# Patient Record
Sex: FEMALE | Race: WHITE | NOT HISPANIC OR LATINO | Employment: OTHER | URBAN - METROPOLITAN AREA
[De-identification: names, ages, dates, MRNs, and addresses within clinical notes are randomized per-mention and may not be internally consistent; named-entity substitution may affect disease eponyms.]

---

## 2017-01-04 RX ORDER — POTASSIUM CHLORIDE 20 MEQ/1
TABLET, EXTENDED RELEASE ORAL
Qty: 60 TAB | Refills: 0 | Status: SHIPPED | OUTPATIENT
Start: 2017-01-04 | End: 2017-02-01 | Stop reason: SDUPTHER

## 2017-01-04 RX ORDER — OMEPRAZOLE 20 MG/1
CAPSULE, DELAYED RELEASE ORAL
Qty: 90 CAP | Refills: 1 | Status: SHIPPED | OUTPATIENT
Start: 2017-01-04 | End: 2017-07-05 | Stop reason: SDUPTHER

## 2017-01-04 NOTE — TELEPHONE ENCOUNTER
Was the patient seen in the last year in this department? Yes     Does patient have an active prescription for medications requested? No     Received Request Via: Pharmacy      Pt met protocol?: Yes    LAST OV 11/02/16

## 2017-01-04 NOTE — TELEPHONE ENCOUNTER
Was the patient seen in the last year in this department? Yes     Does patient have an active prescription for medications requested? No     Received Request Via: Pharmacy      Pt met protocol?: Yes    LAST OV 11/02/16    *CALLED PHARMACY AND CONFIRMED PT DOES NOT HAVE REFILLS LEFT*

## 2017-01-04 NOTE — TELEPHONE ENCOUNTER
-Maximilian Armijo, still no updated labs.... Here's note from 11/21. Please refill as you see fit. Thank you.    Entered by MARTINE Solares at 11/21/2016  7:35 AM      Read by Nena Lamb at 11/21/2016  5:43 PM     Labs reveal some continued subtle abnormalities.  Dehydration continues to be a concern.  Can we repeat the labs in 2-3 weeks?  In the meantime, really focus on hydration.   Thank you,   Zofia            BASIC METABOLIC PANEL (Order [id number])         Component Results      Component Value Ref Range & Units Status     Sodium 137 135 - 145 mmol/L Final     Potassium 3.5 (L) 3.6 - 5.5 mmol/L Final

## 2017-01-27 ENCOUNTER — HOSPITAL ENCOUNTER (OUTPATIENT)
Dept: LAB | Facility: MEDICAL CENTER | Age: 77
End: 2017-01-27
Attending: NURSE PRACTITIONER
Payer: MEDICARE

## 2017-01-27 DIAGNOSIS — E87.1 DEHYDRATION WITH HYPONATREMIA: ICD-10-CM

## 2017-01-27 DIAGNOSIS — E86.0 DEHYDRATION WITH HYPONATREMIA: ICD-10-CM

## 2017-01-27 LAB
ANION GAP SERPL CALC-SCNC: 12 MMOL/L (ref 0–11.9)
BUN SERPL-MCNC: 18 MG/DL (ref 8–22)
CALCIUM SERPL-MCNC: 9.1 MG/DL (ref 8.5–10.5)
CHLORIDE SERPL-SCNC: 99 MMOL/L (ref 96–112)
CO2 SERPL-SCNC: 23 MMOL/L (ref 20–33)
CREAT SERPL-MCNC: 1.13 MG/DL (ref 0.5–1.4)
GLUCOSE SERPL-MCNC: 187 MG/DL (ref 65–99)
POTASSIUM SERPL-SCNC: 3.8 MMOL/L (ref 3.6–5.5)
SODIUM SERPL-SCNC: 134 MMOL/L (ref 135–145)

## 2017-01-27 PROCEDURE — 36415 COLL VENOUS BLD VENIPUNCTURE: CPT

## 2017-01-27 PROCEDURE — 80048 BASIC METABOLIC PNL TOTAL CA: CPT

## 2017-01-28 ENCOUNTER — PATIENT MESSAGE (OUTPATIENT)
Dept: MEDICAL GROUP | Facility: PHYSICIAN GROUP | Age: 77
End: 2017-01-28

## 2017-01-28 DIAGNOSIS — E87.20 LACTIC ACIDOSIS: ICD-10-CM

## 2017-01-28 DIAGNOSIS — E86.0 DEHYDRATION WITH HYPONATREMIA: ICD-10-CM

## 2017-01-28 DIAGNOSIS — E87.6 DIURETIC-INDUCED HYPOKALEMIA: ICD-10-CM

## 2017-01-28 DIAGNOSIS — E87.1 DEHYDRATION WITH HYPONATREMIA: ICD-10-CM

## 2017-01-28 DIAGNOSIS — T50.2X5A DIURETIC-INDUCED HYPOKALEMIA: ICD-10-CM

## 2017-02-01 NOTE — TELEPHONE ENCOUNTER
Was the patient seen in the last year in this department? Yes     Does patient have an active prescription for medications requested? no    Received Request Via: Pharmacy      Pt met protocol?: Yes   OV 11/2/16  BP Readings from Last 1 Encounters:   11/02/16 118/60

## 2017-02-01 NOTE — TELEPHONE ENCOUNTER
Was the patient seen in the last year in this department? Yes     Does patient have an active prescription for medications requested? No     Received Request Via: Pharmacy      Pt met protocol?: Yes  OV 11/2/16  CMP 10/22/16

## 2017-02-02 RX ORDER — ATORVASTATIN CALCIUM 20 MG/1
TABLET, FILM COATED ORAL
Qty: 90 TAB | Refills: 1 | Status: SHIPPED | OUTPATIENT
Start: 2017-02-02 | End: 2017-07-05 | Stop reason: SDUPTHER

## 2017-02-02 RX ORDER — POTASSIUM CHLORIDE 20 MEQ/1
TABLET, EXTENDED RELEASE ORAL
Qty: 180 TAB | Refills: 1 | Status: SHIPPED | OUTPATIENT
Start: 2017-02-02 | End: 2017-07-05 | Stop reason: SDUPTHER

## 2017-02-02 RX ORDER — DONEPEZIL HYDROCHLORIDE 23 MG/1
TABLET, FILM COATED ORAL
Qty: 90 TAB | Refills: 1 | Status: SHIPPED | OUTPATIENT
Start: 2017-02-02 | End: 2017-07-05 | Stop reason: SDUPTHER

## 2017-03-03 NOTE — TELEPHONE ENCOUNTER
Was the patient seen in the last year in this department? Yes     Does patient have an active prescription for medications requested? No     Received Request Via: Patient      Pt met protocol?: Yes  OV 11/2/16

## 2017-03-04 RX ORDER — MEMANTINE HYDROCHLORIDE 14 MG/1
CAPSULE, EXTENDED RELEASE ORAL
Qty: 90 CAP | Refills: 1 | Status: SHIPPED | OUTPATIENT
Start: 2017-03-04 | End: 2017-07-05 | Stop reason: SDUPTHER

## 2017-03-13 ENCOUNTER — APPOINTMENT (OUTPATIENT)
Dept: NEUROLOGY | Facility: MEDICAL CENTER | Age: 77
End: 2017-03-13
Payer: MEDICARE

## 2017-04-04 RX ORDER — QUETIAPINE FUMARATE 25 MG/1
TABLET, FILM COATED ORAL
Qty: 30 TAB | Refills: 2 | Status: SHIPPED | OUTPATIENT
Start: 2017-04-04 | End: 2017-07-05 | Stop reason: SDUPTHER

## 2017-04-04 RX ORDER — LISINOPRIL AND HYDROCHLOROTHIAZIDE 25; 20 MG/1; MG/1
TABLET ORAL
Refills: 0 | OUTPATIENT
Start: 2017-04-04

## 2017-04-04 NOTE — TELEPHONE ENCOUNTER
Last seen by PCP 11/16. Will send 3 months of quetiapine to pharmacy. Lisinopril/hct discontinued 11/16 -phoned in denial to pharmacy.

## 2017-06-02 ENCOUNTER — HOSPITAL ENCOUNTER (OUTPATIENT)
Dept: LAB | Facility: MEDICAL CENTER | Age: 77
End: 2017-06-02
Attending: PHYSICIAN ASSISTANT
Payer: MEDICARE

## 2017-06-02 PROCEDURE — 87186 SC STD MICRODIL/AGAR DIL: CPT

## 2017-06-02 PROCEDURE — 87086 URINE CULTURE/COLONY COUNT: CPT

## 2017-06-02 PROCEDURE — 87077 CULTURE AEROBIC IDENTIFY: CPT

## 2017-06-05 LAB
BACTERIA UR CULT: ABNORMAL
SIGNIFICANT IND 70042: ABNORMAL
SITE SITE: ABNORMAL
SOURCE SOURCE: ABNORMAL

## 2017-06-12 ENCOUNTER — HOSPITAL ENCOUNTER (OUTPATIENT)
Facility: MEDICAL CENTER | Age: 77
End: 2017-06-12
Attending: PHYSICIAN ASSISTANT
Payer: MEDICARE

## 2017-06-12 PROCEDURE — 87077 CULTURE AEROBIC IDENTIFY: CPT

## 2017-06-12 PROCEDURE — 87186 SC STD MICRODIL/AGAR DIL: CPT

## 2017-06-12 PROCEDURE — 87086 URINE CULTURE/COLONY COUNT: CPT

## 2017-06-14 LAB
BACTERIA UR CULT: ABNORMAL
SIGNIFICANT IND 70042: ABNORMAL
SOURCE SOURCE: ABNORMAL

## 2017-07-05 ENCOUNTER — OFFICE VISIT (OUTPATIENT)
Dept: MEDICAL GROUP | Facility: PHYSICIAN GROUP | Age: 77
End: 2017-07-05
Payer: MEDICARE

## 2017-07-05 VITALS
WEIGHT: 138 LBS | RESPIRATION RATE: 14 BRPM | BODY MASS INDEX: 21.66 KG/M2 | DIASTOLIC BLOOD PRESSURE: 70 MMHG | TEMPERATURE: 98.3 F | HEART RATE: 77 BPM | OXYGEN SATURATION: 97 % | HEIGHT: 67 IN | SYSTOLIC BLOOD PRESSURE: 120 MMHG

## 2017-07-05 DIAGNOSIS — Z51.81 ENCOUNTER FOR MONITORING STATIN THERAPY: ICD-10-CM

## 2017-07-05 DIAGNOSIS — R73.01 IFG (IMPAIRED FASTING GLUCOSE): ICD-10-CM

## 2017-07-05 DIAGNOSIS — Z79.899 ENCOUNTER FOR MONITORING STATIN THERAPY: ICD-10-CM

## 2017-07-05 DIAGNOSIS — E55.9 VITAMIN D INSUFFICIENCY: ICD-10-CM

## 2017-07-05 DIAGNOSIS — K21.9 GASTROESOPHAGEAL REFLUX DISEASE WITHOUT ESOPHAGITIS: ICD-10-CM

## 2017-07-05 DIAGNOSIS — E86.0 DEHYDRATION WITH HYPONATREMIA: ICD-10-CM

## 2017-07-05 DIAGNOSIS — E87.1 DEHYDRATION WITH HYPONATREMIA: ICD-10-CM

## 2017-07-05 DIAGNOSIS — Z87.440 HISTORY OF RECURRENT UTI (URINARY TRACT INFECTION): ICD-10-CM

## 2017-07-05 DIAGNOSIS — I10 ESSENTIAL HYPERTENSION: ICD-10-CM

## 2017-07-05 DIAGNOSIS — F03.90 DEMENTIA WITHOUT BEHAVIORAL DISTURBANCE, UNSPECIFIED DEMENTIA TYPE: Primary | ICD-10-CM

## 2017-07-05 DIAGNOSIS — E78.2 MIXED HYPERLIPIDEMIA: ICD-10-CM

## 2017-07-05 PROCEDURE — 99214 OFFICE O/P EST MOD 30 MIN: CPT | Performed by: NURSE PRACTITIONER

## 2017-07-05 RX ORDER — MEMANTINE HYDROCHLORIDE 14 MG/1
1 CAPSULE, EXTENDED RELEASE ORAL DAILY
Qty: 90 CAP | Refills: 1 | Status: SHIPPED | OUTPATIENT
Start: 2017-07-05 | End: 2017-12-27 | Stop reason: SDUPTHER

## 2017-07-05 RX ORDER — QUETIAPINE FUMARATE 25 MG/1
TABLET, FILM COATED ORAL
Refills: 0 | OUTPATIENT
Start: 2017-07-05

## 2017-07-05 RX ORDER — OMEPRAZOLE 20 MG/1
CAPSULE, DELAYED RELEASE ORAL
Qty: 90 CAP | Refills: 1 | Status: SHIPPED | OUTPATIENT
Start: 2017-07-05 | End: 2017-12-27 | Stop reason: SDUPTHER

## 2017-07-05 RX ORDER — ATORVASTATIN CALCIUM 20 MG/1
TABLET, FILM COATED ORAL
Qty: 90 TAB | Refills: 1 | Status: SHIPPED | OUTPATIENT
Start: 2017-07-05 | End: 2017-11-07

## 2017-07-05 RX ORDER — QUETIAPINE FUMARATE 25 MG/1
TABLET, FILM COATED ORAL
Qty: 90 TAB | Refills: 1 | Status: SHIPPED | OUTPATIENT
Start: 2017-07-05 | End: 2017-11-01

## 2017-07-05 RX ORDER — POTASSIUM CHLORIDE 20 MEQ/1
TABLET, EXTENDED RELEASE ORAL
Qty: 180 TAB | Refills: 1 | Status: ON HOLD | OUTPATIENT
Start: 2017-07-05 | End: 2017-11-13

## 2017-07-05 RX ORDER — DONEPEZIL HYDROCHLORIDE 23 MG/1
1 TABLET, FILM COATED ORAL DAILY
Qty: 90 TAB | Refills: 1 | Status: SHIPPED | OUTPATIENT
Start: 2017-07-05 | End: 2017-11-07

## 2017-07-05 RX ORDER — POTASSIUM CHLORIDE 20 MEQ/1
TABLET, EXTENDED RELEASE ORAL
Refills: 0 | OUTPATIENT
Start: 2017-07-05

## 2017-07-05 RX ORDER — OMEPRAZOLE 20 MG/1
CAPSULE, DELAYED RELEASE ORAL
Refills: 0 | OUTPATIENT
Start: 2017-07-05

## 2017-07-05 NOTE — PROGRESS NOTES
Chief Complaint   Patient presents with   • Medication Refill       HISTORY OF PRESENT ILLNESS: Patient is a 76 y.o. female established patient who presents today to discuss the following, she is accompanied by her daughter-in-law, Lilia.    1. Dementia without behavioral disturbance, unspecified dementia type  Patient continues to take Namenda and Aricept daily.  She is also on Seroquel nightly.  Her daughter-in-law feels as though the current medication regimen is working well.  She denies any recent episodes of behavioral concerns or fleeing from her caregiving facility.  She denies any questions or concerns from the caregivers in regards to her care.    2. Essential hypertension  Patient's blood pressure is adequately controlled with her current medication regimen.  Patient's blood pressure is monitored periodically.    3. Mixed hyperlipidemia  Patient continues to use atorvastatin daily.  Unfortunately the patient is not the best historian and side effects are not easily identified.  She is due for repeat fasting labs next month.    4. Vitamin D insufficiency    5. IFG (impaired fasting glucose)    6. Gastroesophageal reflux disease without esophagitis    7. Dehydration with hyponatremia  Patient was found to be dehydrated earlier this year.  Lilia, believes that she drinks adequate amounts of water.  She needs constant reminding.    8. History of recurrent UTI (urinary tract infection)  Patient has a personal history of urinary tract infections.  Unfortunately due to patient's dementia, she has a difficult time expressing symptoms that may suggest urinary tract infection.  She was treated most recently in June by the urologist.    9. Encounter for monitoring statin therapy    Allergies:Review of patient's allergies indicates no known allergies.    Current Outpatient Prescriptions Ordered in [facility]   Medication Sig Dispense Refill   • quetiapine (SEROQUEL) 25 MG Tab TAKE 1 TABLET BY MOUTH IN THE EVENING. 90 Tab 1    • Memantine HCl ER (NAMENDA XR) 14 MG CAPSULE SR 24 HR Take 1 Capsule by mouth every day. 90 Cap 1   • Donepezil HCl 23 MG Tab Take 1 Tab by mouth every day. 90 Tab 1   • atorvastatin (LIPITOR) 20 MG Tab TAKE 1 TABLET BY MOUTH ONCE DAILY. 90 Tab 1   • potassium chloride SA (KDUR) 20 MEQ Tab CR TAKE (1) TABLET BY MOUTH TWICE DAILY. 180 Tab 1   • omeprazole (PRILOSEC) 20 MG delayed-release capsule TAKE (1) CAPSULE BY MOUTH ONCE DAILY. 90 Cap 1   • aspirin EC (ECOTRIN) 81 MG Tablet Delayed Response Take 81 mg by mouth every day.     • polyethylene glycol/lytes (MIRALAX) Pack Take 17 g by mouth every day.     • cetirizine (ZYRTEC) 10 MG Tab Take 10 mg by mouth every day.       No current [facility]-ordered facility-administered medications on file.       Past Medical History   Diagnosis Date   • Hypertension    • Post-menopause on HRT (hormone replacement therapy)    • Dementia 2014   • Hyperlipidemia 2014       Social History   Substance Use Topics   • Smoking status: Former Smoker -- 1.00 packs/day for 20 years   • Smokeless tobacco: Never Used   • Alcohol Use: No       Family Status   Relation Status Death Age   • Mother     • Father       Family History   Problem Relation Age of Onset   • Cancer Mother      colon   • Cancer Father      lung   • Diabetes Paternal Grandmother    • Stroke Neg Hx    • Heart Attack Neg Hx        ROS:  Review of Systems   Constitutional: Negative for fever, chills, weight loss and malaise/fatigue.   HENT: Negative for ear pain, nosebleeds, congestion, sore throat and neck pain.    Eyes: Negative for blurred vision.   Respiratory: Negative for cough, sputum production, shortness of breath and wheezing.    Cardiovascular: Negative for chest pain, palpitations, orthopnea and leg swelling.   Gastrointestinal: Negative for heartburn, nausea, vomiting and abdominal pain.   Genitourinary: Negative for dysuria, urgency and frequency.   Musculoskeletal: Negative for  "myalgias, back pain and joint pain.   Skin: Negative for rash and itching.   Neurological: Negative for dizziness, tingling, tremors, sensory change, focal weakness and headaches.   Endo/Heme/Allergies: Does not bruise/bleed easily.   Psychiatric/Behavioral: Negative for depression, suicidal ideas and memory loss.  The patient is not nervous/anxious and does not have insomnia.        Exam:  Blood pressure 120/70, pulse 77, temperature 36.8 °C (98.3 °F), resp. rate 14, height 1.702 m (5' 7\"), weight 62.596 kg (138 lb), SpO2 97 %.  General:  Well nourished, well developed female in NAD  Head is grossly normal.  Neck: Thyroid is not enlarged.  Pulmonary: Normal effort. No rales, ronchi, or wheezing.  Cardiovascular: Regular rate and rhythm without murmur.   Extremities: no clubbing, cyanosis, or edema.  Psych:  Mood and affect are flat.  Unable to answer questions appropriately.      Please note that this dictation was created using voice recognition software. I have made every reasonable attempt to correct obvious errors, but I expect that there are errors of grammar and possibly content that I did not discover before finalizing the note.    Assessment/Plan:    1. Dementia without behavioral disturbance, unspecified dementia type  quetiapine (SEROQUEL) 25 MG Tab    Memantine HCl ER (NAMENDA XR) 14 MG CAPSULE SR 24 HR    Donepezil HCl 23 MG Tab   2. Essential hypertension     3. Mixed hyperlipidemia  atorvastatin (LIPITOR) 20 MG Tab    LIPID PROFILE   4. Vitamin D insufficiency  VITAMIN D,25 HYDROXY   5. IFG (impaired fasting glucose)  LIPID PROFILE    HEMOGLOBIN A1C    COMP METABOLIC PANEL   6. Gastroesophageal reflux disease without esophagitis  omeprazole (PRILOSEC) 20 MG delayed-release capsule   7. Dehydration with hyponatremia  potassium chloride SA (KDUR) 20 MEQ Tab CR   8. History of recurrent UTI (urinary tract infection)  URINALYSIS,CULTURE IF INDICATED   9. Encounter for monitoring statin therapy  COMP " METABOLIC PANEL        1.  Due for repeat fasting labs, orders given  2.  No changes to medications, refills sent to pharmacy.  Stable and well controlled.  3.  Encouraged influenza vaccine in Sept/Oct  4.  Will contact patient's caregivers with lab results, f/u pending.

## 2017-07-05 NOTE — MR AVS SNAPSHOT
"        Nena SONI Adonis   2017 1:40 PM   Office Visit   MRN: [id number]    Department:  [facility]   Dept Phone:  [phone]    Description:  Female : 1940   Provider:  MARTINE Soalres           Reason for Visit     Medication Refill           Allergies as of 2017     No Known Allergies      You were diagnosed with     Dementia without behavioral disturbance, unspecified dementia type   [8400024]  -  Primary     Essential hypertension   [5728139]       Mixed hyperlipidemia   [272.2.ICD-9-CM]       Vitamin D insufficiency   [280076]       IFG (impaired fasting glucose)   [311513]       Gastroesophageal reflux disease without esophagitis   [471441]       Dehydration with hyponatremia   [460587]       History of recurrent UTI (urinary tract infection)   [738859]       Encounter for monitoring statin therapy   [793860]         Vital Signs     Blood Pressure Pulse Temperature Respirations Height Weight    120/70 mmHg 77 36.8 °C (98.3 °F) 14 1.702 m (5' 7\") 62.596 kg (138 lb)    Body Mass Index Oxygen Saturation Smoking Status             21.61 kg/m2 97% Former Smoker         Basic Information     Date Of Birth Sex Race Ethnicity Preferred Language    1940 Female White Non- English      Problem List              ICD-10-CM Priority Class Noted - Resolved    Dementia F03.90   2014 - Present    Hypertension I10 Low  2014 - Present    Hyperlipidemia E78.5 Low  2014 - Present    Chest pain R07.9   2015 - Present    Ataxia R27.0   2015 - Present    Vertigo R42 Low  2015 - Present    Insomnia G47.00   2016 - Present    Lactic acidosis E87.2   10/18/2016 - Present    Syncope R55   10/18/2016 - Present    Dehydration with hyponatremia E87.1   10/20/2016 - Present    Diuretic-induced hypokalemia E87.6, T50.2X5A   2016 - Present      Health Maintenance        Date Due Completion Dates    IMM DTaP/Tdap/Td Vaccine (1 - Tdap) 1957 ---    IMM " ZOSTER VACCINE 8/6/1998 ---    MAMMOGRAM 10/14/2015 10/14/2014 (Done), 6/21/2013 (Done), 2/24/2004    Override on 10/14/2014: Done    Override on 6/21/2013: Done    IMM INFLUENZA (1) 9/1/2017 10/1/2016, 11/25/2015    BONE DENSITY 10/7/2019 10/7/2014 (Done), 1/25/2012 (Done)    Override on 10/7/2014: Done    Override on 1/25/2012: Done (Normal)            Current Immunizations     13-VALENT PCV PREVNAR 9/1/2015  6:39 PM    Influenza Vaccine Adult HD 10/1/2016, 11/25/2015    Pneumococcal polysaccharide vaccine (PPSV-23) 11/2/2016    Tuberculin Skin Test 12/2/2016, 12/16/2015  7:00 AM, 11/17/2014      Below and/or attached are the medications your provider expects you to take. Review all of your home medications and newly ordered medications with your provider and/or pharmacist. Follow medication instructions as directed by your provider and/or pharmacist. Please keep your medication list with you and share with your provider. Update the information when medications are discontinued, doses are changed, or new medications (including over-the-counter products) are added; and carry medication information at all times in the event of emergency situations     Allergies:  No Known Allergies          Medications  Valid as of: July 05, 2017 -  2:01 PM    Generic Name Brand Name Tablet Size Instructions for use    Aspirin (Tablet Delayed Response) ECOTRIN 81 MG Take 81 mg by mouth every day.        Atorvastatin Calcium (Tab) LIPITOR 20 MG TAKE 1 TABLET BY MOUTH ONCE DAILY.        Cetirizine HCl (Tab) ZYRTEC 10 MG Take 10 mg by mouth every day.        Donepezil HCl (Tab) Donepezil HCl 23 MG Take 1 Tab by mouth every day.        Memantine HCl (CAPSULE SR 24 HR) Memantine HCl ER 14 MG Take 1 Capsule by mouth every day.        Omeprazole (CAPSULE DELAYED RELEASE) PRILOSEC 20 MG TAKE (1) CAPSULE BY MOUTH ONCE DAILY.        Polyethylene Glycol 3350 (Pack) MIRALAX  Take 17 g by mouth every day.        Potassium Chloride Namrata CR (Tab  CR) Kdur 20 MEQ TAKE (1) TABLET BY MOUTH TWICE DAILY.        QUEtiapine Fumarate (Tab) SEROQUEL 25 MG TAKE 1 TABLET BY MOUTH IN THE EVENING.        .                 Medicines prescribed today were sent to:     [facility] - [place], [place] - [address]    [address] [facility] [place] [address]    Phone: [phone] Fax: [phone]    Open 24 Hours?: No      Medication refill instructions:       If your prescription bottle indicates you have medication refills left, it is not necessary to call your provider’s office. Please contact your pharmacy and they will refill your medication.    If your prescription bottle indicates you do not have any refills left, you may request refills at any time through one of the following ways: The online [employer] system (except Urgent Care), by calling your provider’s office, or by asking your pharmacy to contact your provider’s office with a refill request. Medication refills are processed only during regular business hours and may not be available until the next business day. Your provider may request additional information or to have a follow-up visit with you prior to refilling your medication.   *Please Note: Medication refills are assigned a new Rx number when refilled electronically. Your pharmacy may indicate that no refills were authorized even though a new prescription for the same medication is available at the pharmacy. Please request the medicine by name with the pharmacy before contacting your provider for a refill.        Your To Do List     Future Labs/Procedures Complete By Expires    COMP METABOLIC PANEL  As directed 7/5/2018    HEMOGLOBIN A1C  As directed 7/5/2018    LIPID PROFILE  As directed 7/5/2018    URINALYSIS,CULTURE IF INDICATED  As directed 7/5/2018    VITAMIN D,25 HYDROXY  As directed 7/5/2018         [employer] Access Code: Activation code not generated  Current Man[employer]IQ Status: Active

## 2017-07-21 ENCOUNTER — HOSPITAL ENCOUNTER (OUTPATIENT)
Dept: LAB | Facility: MEDICAL CENTER | Age: 77
End: 2017-07-21
Attending: NURSE PRACTITIONER
Payer: MEDICARE

## 2017-07-21 DIAGNOSIS — R73.01 IFG (IMPAIRED FASTING GLUCOSE): ICD-10-CM

## 2017-07-21 DIAGNOSIS — E55.9 VITAMIN D INSUFFICIENCY: ICD-10-CM

## 2017-07-21 DIAGNOSIS — Z87.440 HISTORY OF RECURRENT UTI (URINARY TRACT INFECTION): ICD-10-CM

## 2017-07-21 DIAGNOSIS — E78.2 MIXED HYPERLIPIDEMIA: ICD-10-CM

## 2017-07-21 DIAGNOSIS — Z51.81 ENCOUNTER FOR MONITORING STATIN THERAPY: ICD-10-CM

## 2017-07-21 DIAGNOSIS — Z79.899 ENCOUNTER FOR MONITORING STATIN THERAPY: ICD-10-CM

## 2017-07-21 LAB
25(OH)D3 SERPL-MCNC: 26 NG/ML (ref 30–100)
ALBUMIN SERPL BCP-MCNC: 3.9 G/DL (ref 3.2–4.9)
ALBUMIN/GLOB SERPL: 1.2 G/DL
ALP SERPL-CCNC: 91 U/L (ref 30–99)
ALT SERPL-CCNC: 17 U/L (ref 2–50)
ANION GAP SERPL CALC-SCNC: 8 MMOL/L (ref 0–11.9)
AST SERPL-CCNC: 20 U/L (ref 12–45)
BILIRUB SERPL-MCNC: 0.5 MG/DL (ref 0.1–1.5)
BUN SERPL-MCNC: 15 MG/DL (ref 8–22)
CALCIUM SERPL-MCNC: 9.4 MG/DL (ref 8.5–10.5)
CHLORIDE SERPL-SCNC: 106 MMOL/L (ref 96–112)
CHOLEST SERPL-MCNC: 151 MG/DL (ref 100–199)
CO2 SERPL-SCNC: 26 MMOL/L (ref 20–33)
CREAT SERPL-MCNC: 0.97 MG/DL (ref 0.5–1.4)
EST. AVERAGE GLUCOSE BLD GHB EST-MCNC: 146 MG/DL
GFR SERPL CREATININE-BSD FRML MDRD: 55 ML/MIN/1.73 M 2
GLOBULIN SER CALC-MCNC: 3.3 G/DL (ref 1.9–3.5)
GLUCOSE SERPL-MCNC: 116 MG/DL (ref 65–99)
HBA1C MFR BLD: 6.7 % (ref 0–5.6)
HDLC SERPL-MCNC: 30 MG/DL
LDLC SERPL CALC-MCNC: 79 MG/DL
POTASSIUM SERPL-SCNC: 3.8 MMOL/L (ref 3.6–5.5)
PROT SERPL-MCNC: 7.2 G/DL (ref 6–8.2)
SODIUM SERPL-SCNC: 140 MMOL/L (ref 135–145)
TRIGL SERPL-MCNC: 209 MG/DL (ref 0–149)

## 2017-07-21 PROCEDURE — 87077 CULTURE AEROBIC IDENTIFY: CPT

## 2017-07-21 PROCEDURE — 87186 SC STD MICRODIL/AGAR DIL: CPT

## 2017-07-21 PROCEDURE — 87086 URINE CULTURE/COLONY COUNT: CPT

## 2017-07-21 PROCEDURE — 80053 COMPREHEN METABOLIC PANEL: CPT

## 2017-07-21 PROCEDURE — 82306 VITAMIN D 25 HYDROXY: CPT

## 2017-07-21 PROCEDURE — 83036 HEMOGLOBIN GLYCOSYLATED A1C: CPT

## 2017-07-21 PROCEDURE — 36415 COLL VENOUS BLD VENIPUNCTURE: CPT

## 2017-07-21 PROCEDURE — 81001 URINALYSIS AUTO W/SCOPE: CPT

## 2017-07-21 PROCEDURE — 80061 LIPID PANEL: CPT

## 2017-07-22 LAB
APPEARANCE UR: ABNORMAL
BACTERIA #/AREA URNS HPF: ABNORMAL /HPF
BILIRUB UR QL STRIP.AUTO: NEGATIVE
COLOR UR: YELLOW
CULTURE IF INDICATED INDCX: YES UA CULTURE
EPI CELLS #/AREA URNS HPF: ABNORMAL /HPF
GLUCOSE UR STRIP.AUTO-MCNC: NEGATIVE MG/DL
HYALINE CASTS #/AREA URNS LPF: ABNORMAL /LPF
KETONES UR STRIP.AUTO-MCNC: NEGATIVE MG/DL
LEUKOCYTE ESTERASE UR QL STRIP.AUTO: ABNORMAL
MICRO URNS: ABNORMAL
NITRITE UR QL STRIP.AUTO: POSITIVE
PH UR STRIP.AUTO: 5.5 [PH]
PROT UR QL STRIP: NEGATIVE MG/DL
RBC # URNS HPF: ABNORMAL /HPF
RBC UR QL AUTO: ABNORMAL
SP GR UR STRIP.AUTO: 1.02
UROBILINOGEN UR STRIP.AUTO-MCNC: 0.2 MG/DL
WBC #/AREA URNS HPF: ABNORMAL /HPF

## 2017-07-24 LAB
BACTERIA UR CULT: ABNORMAL
SIGNIFICANT IND 70042: ABNORMAL
SOURCE SOURCE: ABNORMAL

## 2017-07-25 ENCOUNTER — TELEPHONE (OUTPATIENT)
Dept: MEDICAL GROUP | Facility: PHYSICIAN GROUP | Age: 77
End: 2017-07-25

## 2017-07-25 DIAGNOSIS — A49.9 UTI (URINARY TRACT INFECTION), BACTERIAL: ICD-10-CM

## 2017-07-25 DIAGNOSIS — N39.0 UTI (URINARY TRACT INFECTION), BACTERIAL: ICD-10-CM

## 2017-07-25 RX ORDER — SULFAMETHOXAZOLE AND TRIMETHOPRIM 800; 160 MG/1; MG/1
1 TABLET ORAL 2 TIMES DAILY
Qty: 20 TAB | Refills: 0 | Status: SHIPPED | OUTPATIENT
Start: 2017-07-25 | End: 2017-08-04

## 2017-07-25 NOTE — TELEPHONE ENCOUNTER
----- Message from MARTINE Solares sent at 7/25/2017  8:40 AM PDT -----  Labs are stable with the exception of an urinary tract infection.  Patient needs antibiotics that I will send to the pharmacy on file.  Consider repeating UA if symptoms fail to resolve.  Thank you

## 2017-08-07 ENCOUNTER — PATIENT OUTREACH (OUTPATIENT)
Dept: HEALTH INFORMATION MANAGEMENT | Facility: OTHER | Age: 77
End: 2017-08-07

## 2017-08-07 NOTE — PROGRESS NOTES
Attempt #:2    WebIZ Checked & Epic Updated: yes  HealthConnect Verified: no  Verify PCP: yes    Communication Preference Obtained: yes     Review Care Team: yes    Annual Wellness Visit Scheduling  1. Scheduling Status:Not Scheduled. Patient states they are not interested        Care Gap Scheduling (Attempt to Schedule EACH Overdue Care Gap!)     Health Maintenance Due   Topic Date Due   • IMM DTaP/Tdap/Td Vaccine (1 - Tdap) PT TO F/U PER [facility]   • IMM ZOSTER VACCINE  PT TO F/U PER [facility]   • MAMMOGRAM  DECLINED PER [facility]         Funding Options Activation: already active  Funding Options Remy: no  Virtual Visits: no  Opt In to Text Messages: no

## 2017-08-30 DIAGNOSIS — I10 ESSENTIAL HYPERTENSION: ICD-10-CM

## 2017-08-30 RX ORDER — ASPIRIN 81 MG
TABLET, DELAYED RELEASE (ENTERIC COATED) ORAL
Qty: 90 TAB | Refills: 1 | Status: SHIPPED | OUTPATIENT
Start: 2017-08-30 | End: 2017-11-01

## 2017-09-08 ENCOUNTER — OFFICE VISIT (OUTPATIENT)
Dept: URGENT CARE | Facility: PHYSICIAN GROUP | Age: 77
End: 2017-09-08
Payer: MEDICARE

## 2017-09-08 VITALS
DIASTOLIC BLOOD PRESSURE: 64 MMHG | OXYGEN SATURATION: 98 % | HEART RATE: 70 BPM | BODY MASS INDEX: 21.14 KG/M2 | RESPIRATION RATE: 20 BRPM | TEMPERATURE: 97.7 F | SYSTOLIC BLOOD PRESSURE: 112 MMHG | WEIGHT: 135 LBS

## 2017-09-08 DIAGNOSIS — R11.2 NON-INTRACTABLE VOMITING WITH NAUSEA, UNSPECIFIED VOMITING TYPE: ICD-10-CM

## 2017-09-08 PROCEDURE — 99213 OFFICE O/P EST LOW 20 MIN: CPT | Performed by: NURSE PRACTITIONER

## 2017-09-08 RX ORDER — LORAZEPAM 0.5 MG/1
0.5 TABLET ORAL EVERY 4 HOURS PRN
COMMUNITY
End: 2017-11-17

## 2017-09-08 RX ORDER — IBUPROFEN 600 MG/1
600 TABLET ORAL EVERY 6 HOURS PRN
Status: ON HOLD | COMMUNITY
End: 2017-11-13

## 2017-09-08 RX ORDER — LISINOPRIL AND HYDROCHLOROTHIAZIDE 25; 20 MG/1; MG/1
1 TABLET ORAL DAILY
Status: ON HOLD | COMMUNITY
End: 2017-11-13

## 2017-09-08 ASSESSMENT — ENCOUNTER SYMPTOMS
VOMITING: 1
DIARRHEA: 1
NAUSEA: 1
COUGH: 0
NUMBER OF EPISODES OF EMESIS TODAY: 1
FEVER: 0

## 2017-09-08 NOTE — LETTER
September 8, 2017         Patient: Nena Lamb   YOB: 1940   Date of Visit: 9/8/2017           To Whom it May Concern:    Nena Lamb was seen in my clinic on 9/8/2017. She may return to [facility] as long at the nausea and  vomiting have resolved.     If you have any questions or concerns, please don't hesitate to call.        Sincerely,           EBONIE Morales.  Electronically Signed

## 2017-09-08 NOTE — PATIENT INSTRUCTIONS
Nausea and Vomiting  Nausea is a sick feeling that often comes before throwing up (vomiting). Vomiting is a reflex where stomach contents come out of your mouth. Vomiting can cause severe loss of body fluids (dehydration). Children and elderly adults can become dehydrated quickly, especially if they also have diarrhea. Nausea and vomiting are symptoms of a condition or disease. It is important to find the cause of your symptoms.  CAUSES   · Direct irritation of the stomach lining. This irritation can result from increased acid production (gastroesophageal reflux disease), infection, food poisoning, taking certain medicines (such as nonsteroidal anti-inflammatory drugs), alcohol use, or tobacco use.  · Signals from the brain. These signals could be caused by a headache, heat exposure, an inner ear disturbance, increased pressure in the brain from injury, infection, a tumor, or a concussion, pain, emotional stimulus, or metabolic problems.  · An obstruction in the gastrointestinal tract (bowel obstruction).  · Illnesses such as diabetes, hepatitis, gallbladder problems, appendicitis, kidney problems, cancer, sepsis, atypical symptoms of a heart attack, or eating disorders.  · Medical treatments such as chemotherapy and radiation.  · Receiving medicine that makes you sleep (general anesthetic) during surgery.  DIAGNOSIS  Your caregiver may ask for tests to be done if the problems do not improve after a few days. Tests may also be done if symptoms are severe or if the reason for the nausea and vomiting is not clear. Tests may include:  · Urine tests.  · Blood tests.  · Stool tests.  · Cultures (to look for evidence of infection).  · X-rays or other imaging studies.  Test results can help your caregiver make decisions about treatment or the need for additional tests.  TREATMENT  You need to stay well hydrated. Drink frequently but in small amounts. You may wish to drink water, sports drinks, clear broth, or eat frozen  ice pops or gelatin dessert to help stay hydrated. When you eat, eating slowly may help prevent nausea. There are also some antinausea medicines that may help prevent nausea.  HOME CARE INSTRUCTIONS   · Take all medicine as directed by your caregiver.  · If you do not have an appetite, do not force yourself to eat. However, you must continue to drink fluids.  · If you have an appetite, eat a normal diet unless your caregiver tells you differently.  ¨ Eat a variety of complex carbohydrates (rice, wheat, potatoes, bread), lean meats, yogurt, fruits, and vegetables.  ¨ Avoid high-fat foods because they are more difficult to digest.  · Drink enough water and fluids to keep your urine clear or pale yellow.  · If you are dehydrated, ask your caregiver for specific rehydration instructions. Signs of dehydration may include:  ¨ Severe thirst.  ¨ Dry lips and mouth.  ¨ Dizziness.  ¨ Dark urine.  ¨ Decreasing urine frequency and amount.  ¨ Confusion.  ¨ Rapid breathing or pulse.  SEEK IMMEDIATE MEDICAL CARE IF:   · You have blood or brown flecks (like coffee grounds) in your vomit.  · You have black or bloody stools.  · You have a severe headache or stiff neck.  · You are confused.  · You have severe abdominal pain.  · You have chest pain or trouble breathing.  · You do not urinate at least once every 8 hours.  · You develop cold or clammy skin.  · You continue to vomit for longer than 24 to 48 hours.  · You have a fever.  MAKE SURE YOU:   · Understand these instructions.  · Will watch your condition.  · Will get help right away if you are not doing well or get worse.     This information is not intended to replace advice given to you by your health care provider. Make sure you discuss any questions you have with your health care provider.     Document Released: 12/18/2006 Document Revised: 03/11/2013 Document Reviewed: 05/16/2012  [employer] Interactive Patient Education ©2016 [employer]

## 2017-09-08 NOTE — PROGRESS NOTES
Subjective:      Nena Lamb is a 77 y.o. female who presents with Emesis (Emesis earlier in the day.)            Bib family after vomiting several times today at adult . Pt has dementia ROS and Hx was obtained thru family. Family state she has not vomited since they picked her up from .     Medications, Allergies and Prior Medical Hx reviewed and updated in [facility].with patient/family today         Emesis   This is a new problem. The current episode started today. Episode frequency: 2-3 times. The problem has been gradually improving. Associated symptoms include nausea and vomiting. Pertinent negatives include no congestion, coughing or fever. Nothing aggravates the symptoms. She has tried nothing for the symptoms. The treatment provided no relief.       Review of Systems   Unable to perform ROS: Dementia   Constitutional: Negative for fever and malaise/fatigue.   HENT: Negative for congestion.    Respiratory: Negative for cough.    Gastrointestinal: Positive for diarrhea (possible episode 3 days ago), nausea and vomiting.          Objective:     /64   Pulse 70   Temp 36.5 °C (97.7 °F)   Resp 20   Wt 61.2 kg (135 lb)   SpO2 98%   BMI 21.14 kg/m²      Physical Exam   Constitutional: She appears well-developed and well-nourished. No distress.   HENT:   Head: Normocephalic and atraumatic.   Eyes: Conjunctivae are normal. Pupils are equal, round, and reactive to light.   Neck: Neck supple.   Cardiovascular: Normal rate, regular rhythm and normal heart sounds.    Pulmonary/Chest: Effort normal and breath sounds normal. No respiratory distress.   Abdominal: Soft. Bowel sounds are normal. She exhibits no distension and no fluid wave. There is no tenderness. There is no rigidity, no guarding, no CVA tenderness, no tenderness at McBurney's point and negative Hankins's sign.   On initial exam pt c/o diffuse tenderness, on re check she denied any tenderness.    Musculoskeletal: She exhibits no edema.    Neurological: She is alert.   Awake, alert, answering questions appropriately, moving all extremeties   Skin: Skin is warm and dry.   Psychiatric: She has a normal mood and affect. Her behavior is normal.   Vitals reviewed.              Assessment/Plan:     1. Non-intractable vomiting with nausea, unspecified vomiting type         Epic generated written imformation provided along with verbal instructions for  Vomiting    Letter written for may return to  with vomiting resolve.         Clear Liquid Diet adv as tolerated to bland solid food, and then to normal diet  Pt will go to the ER for worsening or changing symptoms as discussed, fevers, continued vomiting, abdominal pain, or any other concerns  Follow-up with your primary care provider or return here if not improving in 2 days   Discharge instructions discussed with pt/family who verbalize understanding and agreement with poc

## 2017-11-01 ENCOUNTER — OFFICE VISIT (OUTPATIENT)
Dept: MEDICAL GROUP | Facility: PHYSICIAN GROUP | Age: 77
End: 2017-11-01
Payer: MEDICARE

## 2017-11-01 VITALS
WEIGHT: 135 LBS | DIASTOLIC BLOOD PRESSURE: 70 MMHG | TEMPERATURE: 98.6 F | RESPIRATION RATE: 12 BRPM | OXYGEN SATURATION: 98 % | SYSTOLIC BLOOD PRESSURE: 124 MMHG | HEIGHT: 67 IN | BODY MASS INDEX: 21.19 KG/M2 | HEART RATE: 75 BPM

## 2017-11-01 DIAGNOSIS — Z23 NEED FOR INFLUENZA VACCINATION: ICD-10-CM

## 2017-11-01 DIAGNOSIS — E11.9 DIET-CONTROLLED DIABETES MELLITUS (HCC): ICD-10-CM

## 2017-11-01 DIAGNOSIS — H61.23 BILATERAL IMPACTED CERUMEN: ICD-10-CM

## 2017-11-01 DIAGNOSIS — Z11.1 ENCOUNTER FOR PPD TEST: ICD-10-CM

## 2017-11-01 DIAGNOSIS — F03.91 DEMENTIA WITH BEHAVIORAL DISTURBANCE, UNSPECIFIED DEMENTIA TYPE: ICD-10-CM

## 2017-11-01 DIAGNOSIS — L60.2 OVERGROWN TOENAILS: ICD-10-CM

## 2017-11-01 PROCEDURE — 99204 OFFICE O/P NEW MOD 45 MIN: CPT | Mod: 25 | Performed by: INTERNAL MEDICINE

## 2017-11-01 PROCEDURE — 90662 IIV NO PRSV INCREASED AG IM: CPT | Performed by: INTERNAL MEDICINE

## 2017-11-01 PROCEDURE — 86580 TB INTRADERMAL TEST: CPT | Performed by: INTERNAL MEDICINE

## 2017-11-01 PROCEDURE — G0008 ADMIN INFLUENZA VIRUS VAC: HCPCS | Performed by: INTERNAL MEDICINE

## 2017-11-01 RX ORDER — QUETIAPINE FUMARATE 50 MG/1
TABLET, FILM COATED ORAL
Qty: 90 TAB | Refills: 3 | Status: SHIPPED | OUTPATIENT
Start: 2017-11-01

## 2017-11-01 RX ORDER — LANOLIN ALCOHOL/MO/W.PET/CERES
1000 CREAM (GRAM) TOPICAL DAILY
Qty: 90 TAB | Refills: 3 | Status: SHIPPED | OUTPATIENT
Start: 2017-11-01 | End: 2018-08-27 | Stop reason: SDUPTHER

## 2017-11-01 NOTE — ASSESSMENT & PLAN NOTE
Here with daughter in law Lilia. Lilia is concerned because the group home has been informing them that Nena is taunting and teasing the other residents (sticking her tongue out, poking). Nena isn't violent. Nena states she isn't bothering co-residents and they are behaving well towards her; she feels safe there. Has been on seroquel for 2 years. Has been on Namenda and donepezil for quite some time but family has noted a steady progression. They've been told she's progressing to Alzheimer's. Has been in the group home since 2013/2014 when she moved here from [place]. Has been evaluated by Dr. Malloy here, wasn't able to tolerate MRI. He recommended B12 1000 mcg daily but she isn't taking any.

## 2017-11-01 NOTE — PATIENT INSTRUCTIONS
· Please ask your pharmacist about the shingles vaccine   · Please schedule a MA visit for the Tdap vaccine

## 2017-11-01 NOTE — ASSESSMENT & PLAN NOTE
Patient denies any difficulty with hearing but daughter in law Lilia says she has noted decreased hearing. Patient agreeable to a lavage today.

## 2017-11-01 NOTE — PROGRESS NOTES
PRIMARY CARE CLINIC NEW PATIENT H&P  Chief Complaint   Patient presents with   • Immunizations     TB   • Dementia       History of Present Illness     Here with daughter in law Lilia     Dementia with behavioral disturbance  Here with daughter in law Lilia. Lilia is concerned because the group home has been informing them that Nena is taunting and teasing the other residents (sticking her tongue out, poking). Nena isn't violent. Nena states she isn't bothering co-residents and they are behaving well towards her; she feels safe there. Has been on seroquel for 2 years. Has been on Namenda and donepezil for quite some time but family has noted a steady progression. They've been told she's progressing to Alzheimer's. Has been in the group home since 2013/2014 when she moved here from [place]. Has been evaluated by Dr. Malloy here, wasn't able to tolerate MRI. He recommended B12 1000 mcg daily but she isn't taking any.     Bilateral impacted cerumen  Patient denies any difficulty with hearing but daughter in law Lilia says she has noted decreased hearing. Patient agreeable to a lavage today.     Diet-controlled diabetes mellitus (CMS-[facility])  Had elevated fasting sugar and HgbA1c 6.7% this year.       Current Outpatient Prescriptions   Medication Sig Dispense Refill   • quetiapine (SEROQUEL) 50 MG tablet Take once tablet in the evening 90 Tab 3   • Cyanocobalamin (VITAMIN B-12) 1000 MCG Tab Take 1 Tab by mouth every day. 90 Tab 3   • lisinopril-hydrochlorothiazide (PRINZIDE, ZESTORETIC) 20-25 MG per tablet Take 1 Tab by mouth every day.     • lorazepam (ATIVAN) 0.5 MG Tab Take 0.5 mg by mouth every four hours as needed for Anxiety.     • ibuprofen (MOTRIN) 600 MG Tab Take 600 mg by mouth every 6 hours as needed.     • Memantine HCl ER (NAMENDA XR) 14 MG CAPSULE SR 24 HR Take 1 Capsule by mouth every day. 90 Cap 1   • Donepezil HCl 23 MG Tab Take 1 Tab by mouth every day. 90 Tab 1   • atorvastatin (LIPITOR) 20 MG Tab TAKE  1 TABLET BY MOUTH ONCE DAILY. 90 Tab 1   • potassium chloride SA (KDUR) 20 MEQ Tab CR TAKE (1) TABLET BY MOUTH TWICE DAILY. 180 Tab 1   • omeprazole (PRILOSEC) 20 MG delayed-release capsule TAKE (1) CAPSULE BY MOUTH ONCE DAILY. 90 Cap 1   • aspirin EC (ECOTRIN) 81 MG Tablet Delayed Response Take 81 mg by mouth every day.     • polyethylene glycol/lytes (MIRALAX) Pack Take 17 g by mouth every day.     • cetirizine (ZYRTEC) 10 MG Tab Take 10 mg by mouth every day.       No current facility-administered medications for this visit.        Past Medical History:   Diagnosis Date   • Dementia 2014   • Diet-controlled diabetes mellitus (CMS-HCC) 2017   • Hyperlipidemia 2014   • Hypertension    • Insomnia 2016     Past Surgical History:   Procedure Laterality Date   • ABDOMINAL HYSTERECTOMY TOTAL      heavy periods     Social History   Substance Use Topics   • Smoking status: Former Smoker     Packs/day: 1.00     Years: 20.00   • Smokeless tobacco: Never Used   • Alcohol use No     Social History     Social History Narrative    Lives in a group home ([facility] on [address] in [place]) since she moved to [place] from [place] in  because of her dementia. Retired from [employer].      Family History   Problem Relation Age of Onset   • Cancer Mother      colon   • Cancer Father      lung   • Diabetes Paternal Grandmother    • Stroke Neg Hx    • Heart Attack Neg Hx      Family Status   Relation Status   • Mother    • Father    • Paternal Grandmother    • Neg Hx      Allergies: Review of patient's allergies indicates no known allergies.    ROS  Constitutional: Negative for fatigue/generalized weakness.   HEENT: Negative for  vision changes, hearing changes    Respiratory: Negative for shortness of breath  Cardiovascular: Negative for chest pain, palpitations  Gastrointestinal: Negative for blood in stool, constipation, diarrhea  Genitourinary: Negative for dysuria,  "polyuria  Musculoskeletal: Negative for myalgias, back pain, and joint pain.   Skin: Negative for rash  Neurological: Negative for numbness, tingling  Psychiatric/Behavioral: positive for behavior changes per group home report      Objective   Blood pressure 124/70, pulse 75, temperature 37 °C (98.6 °F), resp. rate 12, height 1.702 m (5' 7\"), weight 61.2 kg (135 lb), SpO2 98 %. Body mass index is 21.14 kg/m².    General: Alert, oriented. In no acute distress   HEET: EOMI, PERRL, conjunctiva non-injected, sclera non-icteric.  Nares patent with no significant congestion or drainage.  Lisa pinnae, external auditory canals, cerumen bilaterally. Oral mucous membranes pink and moist with no lesions.  Neck: supple with no cervical, subclavicular lymphadenopathy, JVD, palpable thyroid nodules   Lungs: clear to auscultation bilaterally with good excursion.  CV: regular rate and rhythm.  Abdomen soft, non-distended, non-tender with normal bowel sounds. No hepatosplenomegaly, no masses palpated  Skin: no lesions. Warm, dry   Psychiatric: appropriate mood and affect       Assessment and Plan   The following treatment plan was discussed     1. Need for influenza vaccination  Given today.   - INFLUENZA VACCINE, HIGH DOSE (65+ ONLY)    2. Encounter for PPD test  Requires PPD for group home residence, given test today.   - PPD Skin Test    3. Dementia with behavioral disturbance, unspecified dementia type  [facility] has reported to family that patient's behavior has been changing recently. She's teasing the other residents (verbally and sticking out tongue, poking) but not violent or threatening anyone. Will increase her seroquel dose from 25 mg to 50 mg qHS. Also start on vitamin B12 1000 mcg daily. Will re-assess at next visit in 2 months.   - quetiapine (SEROQUEL) 50 MG tablet; Take once tablet in the evening  Dispense: 90 Tab; Refill: 3  - Cyanocobalamin (VITAMIN B-12) 1000 MCG Tab; Take 1 Tab by mouth every day.  Dispense: " 90 Tab; Refill: 3    4. Bilateral impacted cerumen  Patient didn't tolerate lavage.     5. Diet-controlled diabetes mellitus (CMS-HCC)  Patient had HgbA1c of 6.7% earlier this year. Patient denies polyuria, polydipsia, tingling/numbess of fingers and toes. At this point given the severity of the patient's dementia I would prefer to avoid polypharmacy and prefer to continue a diet control method for managing her diabetes. Family is agreeable with this plan. Will recheck her labs in 6 months.     Return in about 3 months (around 2/1/2018).    Health Maintenance      Health Maintenance Due   Topic Date Due   • IMM DTaP/Tdap/Td Vaccine (1 - Tdap) 08/06/1959   • IMM ZOSTER VACCINE  08/06/1998   • IMM INFLUENZA (1) 09/01/2017     Vasquez Mary MD  Internal Medicine  [facility]

## 2017-11-02 RX ORDER — LORAZEPAM 0.5 MG/1
0.5 TABLET ORAL EVERY 4 HOURS PRN
Qty: 30 TAB | Refills: 0
Start: 2017-11-02

## 2017-11-02 NOTE — TELEPHONE ENCOUNTER
Home is requesting medication states it  and patient hasn't used it in 2 years      Was the patient seen in the last year in this department? Yes     Does patient have an active prescription for medications requested? No     Received Request Via: Pharmacy

## 2017-11-02 NOTE — TELEPHONE ENCOUNTER
Patient's night time seroquel dose was just doubled so I would like to avoid prescribing ativan while this new dose change takes affect

## 2017-11-03 ENCOUNTER — NON-PROVIDER VISIT (OUTPATIENT)
Dept: URGENT CARE | Facility: PHYSICIAN GROUP | Age: 77
End: 2017-11-03

## 2017-11-03 LAB — TB WHEAL 3D P 5 TU DIAM: NORMAL MM

## 2017-11-07 ENCOUNTER — HOSPITAL ENCOUNTER (INPATIENT)
Facility: MEDICAL CENTER | Age: 77
LOS: 6 days | DRG: 871 | End: 2017-11-13
Attending: EMERGENCY MEDICINE | Admitting: HOSPITALIST
Payer: MEDICARE

## 2017-11-07 ENCOUNTER — APPOINTMENT (OUTPATIENT)
Dept: RADIOLOGY | Facility: MEDICAL CENTER | Age: 77
DRG: 871 | End: 2017-11-07
Attending: EMERGENCY MEDICINE
Payer: MEDICARE

## 2017-11-07 ENCOUNTER — RESOLUTE PROFESSIONAL BILLING HOSPITAL PROF FEE (OUTPATIENT)
Dept: HOSPITALIST | Facility: MEDICAL CENTER | Age: 77
End: 2017-11-07
Payer: MEDICARE

## 2017-11-07 DIAGNOSIS — N30.00 ACUTE CYSTITIS WITHOUT HEMATURIA: ICD-10-CM

## 2017-11-07 DIAGNOSIS — E87.1 DEHYDRATION WITH HYPONATREMIA: ICD-10-CM

## 2017-11-07 DIAGNOSIS — E87.20 LACTIC ACIDOSIS: ICD-10-CM

## 2017-11-07 DIAGNOSIS — R40.4 ALTERED LEVEL OF CONSCIOUSNESS: ICD-10-CM

## 2017-11-07 DIAGNOSIS — E86.0 DEHYDRATION WITH HYPONATREMIA: ICD-10-CM

## 2017-11-07 DIAGNOSIS — F03.91 DEMENTIA WITH BEHAVIORAL DISTURBANCE, UNSPECIFIED DEMENTIA TYPE: ICD-10-CM

## 2017-11-07 PROBLEM — A41.9 SEPTIC SHOCK (HCC): Status: ACTIVE | Noted: 2017-11-07

## 2017-11-07 PROBLEM — R65.21 SEPTIC SHOCK (HCC): Status: ACTIVE | Noted: 2017-11-07

## 2017-11-07 LAB
ALBUMIN SERPL BCP-MCNC: 3.5 G/DL (ref 3.2–4.9)
ALBUMIN/GLOB SERPL: 1 G/DL
ALP SERPL-CCNC: 85 U/L (ref 30–99)
ALT SERPL-CCNC: 11 U/L (ref 2–50)
AMORPH CRY #/AREA URNS HPF: PRESENT /HPF
ANION GAP SERPL CALC-SCNC: 12 MMOL/L (ref 0–11.9)
APPEARANCE UR: ABNORMAL
AST SERPL-CCNC: 12 U/L (ref 12–45)
BACTERIA #/AREA URNS HPF: ABNORMAL /HPF
BASOPHILS # BLD AUTO: 0.4 % (ref 0–1.8)
BASOPHILS # BLD: 0.03 K/UL (ref 0–0.12)
BILIRUB SERPL-MCNC: 0.4 MG/DL (ref 0.1–1.5)
BILIRUB UR QL STRIP.AUTO: NEGATIVE
BUN SERPL-MCNC: 16 MG/DL (ref 8–22)
CALCIUM SERPL-MCNC: 9.2 MG/DL (ref 8.5–10.5)
CHLORIDE SERPL-SCNC: 106 MMOL/L (ref 96–112)
CO2 SERPL-SCNC: 19 MMOL/L (ref 20–33)
COLOR UR: YELLOW
CREAT SERPL-MCNC: 0.94 MG/DL (ref 0.5–1.4)
CULTURE IF INDICATED INDCX: YES UA CULTURE
EKG IMPRESSION: NORMAL
EOSINOPHIL # BLD AUTO: 0.04 K/UL (ref 0–0.51)
EOSINOPHIL NFR BLD: 0.5 % (ref 0–6.9)
EPI CELLS #/AREA URNS HPF: ABNORMAL /HPF
ERYTHROCYTE [DISTWIDTH] IN BLOOD BY AUTOMATED COUNT: 44 FL (ref 35.9–50)
GFR SERPL CREATININE-BSD FRML MDRD: 57 ML/MIN/1.73 M 2
GLOBULIN SER CALC-MCNC: 3.5 G/DL (ref 1.9–3.5)
GLUCOSE SERPL-MCNC: 178 MG/DL (ref 65–99)
GLUCOSE UR STRIP.AUTO-MCNC: 100 MG/DL
HCT VFR BLD AUTO: 39.9 % (ref 37–47)
HGB BLD-MCNC: 13 G/DL (ref 12–16)
IMM GRANULOCYTES # BLD AUTO: 0.03 K/UL (ref 0–0.11)
IMM GRANULOCYTES NFR BLD AUTO: 0.4 % (ref 0–0.9)
KETONES UR STRIP.AUTO-MCNC: NEGATIVE MG/DL
LACTATE BLD-SCNC: 2.3 MMOL/L (ref 0.5–2)
LACTATE BLD-SCNC: 2.6 MMOL/L (ref 0.5–2)
LACTATE BLD-SCNC: 4.4 MMOL/L (ref 0.5–2)
LEUKOCYTE ESTERASE UR QL STRIP.AUTO: ABNORMAL
LIPASE SERPL-CCNC: 68 U/L (ref 11–82)
LYMPHOCYTES # BLD AUTO: 0.75 K/UL (ref 1–4.8)
LYMPHOCYTES NFR BLD: 9.6 % (ref 22–41)
MCH RBC QN AUTO: 29.1 PG (ref 27–33)
MCHC RBC AUTO-ENTMCNC: 32.6 G/DL (ref 33.6–35)
MCV RBC AUTO: 89.3 FL (ref 81.4–97.8)
MICRO URNS: ABNORMAL
MONOCYTES # BLD AUTO: 0.37 K/UL (ref 0–0.85)
MONOCYTES NFR BLD AUTO: 4.7 % (ref 0–13.4)
MUCOUS THREADS #/AREA URNS HPF: ABNORMAL /HPF
NEUTROPHILS # BLD AUTO: 6.61 K/UL (ref 2–7.15)
NEUTROPHILS NFR BLD: 84.4 % (ref 44–72)
NITRITE UR QL STRIP.AUTO: POSITIVE
NRBC # BLD AUTO: 0 K/UL
NRBC BLD AUTO-RTO: 0 /100 WBC
PH UR STRIP.AUTO: 5 [PH]
PLATELET # BLD AUTO: 240 K/UL (ref 164–446)
PMV BLD AUTO: 9.8 FL (ref 9–12.9)
POTASSIUM SERPL-SCNC: 3.9 MMOL/L (ref 3.6–5.5)
PROT SERPL-MCNC: 7 G/DL (ref 6–8.2)
PROT UR QL STRIP: 30 MG/DL
RBC # BLD AUTO: 4.47 M/UL (ref 4.2–5.4)
RBC # URNS HPF: ABNORMAL /HPF
RBC UR QL AUTO: ABNORMAL
SODIUM SERPL-SCNC: 137 MMOL/L (ref 135–145)
SP GR UR STRIP.AUTO: 1.02
TROPONIN I SERPL-MCNC: <0.01 NG/ML (ref 0–0.04)
UROBILINOGEN UR STRIP.AUTO-MCNC: 1 MG/DL
WBC # BLD AUTO: 7.8 K/UL (ref 4.8–10.8)
WBC #/AREA URNS HPF: ABNORMAL /HPF

## 2017-11-07 PROCEDURE — 85025 COMPLETE CBC W/AUTO DIFF WBC: CPT

## 2017-11-07 PROCEDURE — 94760 N-INVAS EAR/PLS OXIMETRY 1: CPT

## 2017-11-07 PROCEDURE — 84484 ASSAY OF TROPONIN QUANT: CPT

## 2017-11-07 PROCEDURE — 80053 COMPREHEN METABOLIC PANEL: CPT

## 2017-11-07 PROCEDURE — 93005 ELECTROCARDIOGRAM TRACING: CPT | Performed by: EMERGENCY MEDICINE

## 2017-11-07 PROCEDURE — 87040 BLOOD CULTURE FOR BACTERIA: CPT

## 2017-11-07 PROCEDURE — 99223 1ST HOSP IP/OBS HIGH 75: CPT | Mod: AI | Performed by: HOSPITALIST

## 2017-11-07 PROCEDURE — 83605 ASSAY OF LACTIC ACID: CPT

## 2017-11-07 PROCEDURE — 700101 HCHG RX REV CODE 250: Performed by: HOSPITALIST

## 2017-11-07 PROCEDURE — 83690 ASSAY OF LIPASE: CPT

## 2017-11-07 PROCEDURE — 700111 HCHG RX REV CODE 636 W/ 250 OVERRIDE (IP): Performed by: INTERNAL MEDICINE

## 2017-11-07 PROCEDURE — 87086 URINE CULTURE/COLONY COUNT: CPT

## 2017-11-07 PROCEDURE — 770022 HCHG ROOM/CARE - ICU (200)

## 2017-11-07 PROCEDURE — 70450 CT HEAD/BRAIN W/O DYE: CPT

## 2017-11-07 PROCEDURE — 700102 HCHG RX REV CODE 250 W/ 637 OVERRIDE(OP): Performed by: HOSPITALIST

## 2017-11-07 PROCEDURE — A9270 NON-COVERED ITEM OR SERVICE: HCPCS | Performed by: HOSPITALIST

## 2017-11-07 PROCEDURE — 700105 HCHG RX REV CODE 258: Performed by: HOSPITALIST

## 2017-11-07 PROCEDURE — 87186 SC STD MICRODIL/AGAR DIL: CPT

## 2017-11-07 PROCEDURE — 96360 HYDRATION IV INFUSION INIT: CPT

## 2017-11-07 PROCEDURE — 700111 HCHG RX REV CODE 636 W/ 250 OVERRIDE (IP): Performed by: HOSPITALIST

## 2017-11-07 PROCEDURE — 99291 CRITICAL CARE FIRST HOUR: CPT

## 2017-11-07 PROCEDURE — 87077 CULTURE AEROBIC IDENTIFY: CPT

## 2017-11-07 PROCEDURE — 71010 DX-CHEST-PORTABLE (1 VIEW): CPT

## 2017-11-07 PROCEDURE — 700105 HCHG RX REV CODE 258: Performed by: EMERGENCY MEDICINE

## 2017-11-07 PROCEDURE — 81001 URINALYSIS AUTO W/SCOPE: CPT

## 2017-11-07 RX ORDER — SODIUM CHLORIDE 9 MG/ML
30 INJECTION, SOLUTION INTRAVENOUS
Status: COMPLETED | OUTPATIENT
Start: 2017-11-07 | End: 2017-11-07

## 2017-11-07 RX ORDER — SODIUM CHLORIDE AND POTASSIUM CHLORIDE 150; 900 MG/100ML; MG/100ML
INJECTION, SOLUTION INTRAVENOUS CONTINUOUS
Status: DISCONTINUED | OUTPATIENT
Start: 2017-11-07 | End: 2017-11-10

## 2017-11-07 RX ORDER — CEFTRIAXONE 1 G/1
1 INJECTION, POWDER, FOR SOLUTION INTRAMUSCULAR; INTRAVENOUS ONCE
Status: DISCONTINUED | OUTPATIENT
Start: 2017-11-07 | End: 2017-11-07

## 2017-11-07 RX ORDER — DONEPEZIL HYDROCHLORIDE 5 MG/1
20 TABLET, FILM COATED ORAL EVERY EVENING
Status: DISCONTINUED | OUTPATIENT
Start: 2017-11-07 | End: 2017-11-13 | Stop reason: HOSPADM

## 2017-11-07 RX ORDER — MEMANTINE HYDROCHLORIDE 14 MG/1
1 CAPSULE, EXTENDED RELEASE ORAL DAILY
Status: DISCONTINUED | OUTPATIENT
Start: 2017-11-07 | End: 2017-11-07

## 2017-11-07 RX ORDER — BISACODYL 10 MG
10 SUPPOSITORY, RECTAL RECTAL
Status: DISCONTINUED | OUTPATIENT
Start: 2017-11-07 | End: 2017-11-13 | Stop reason: HOSPADM

## 2017-11-07 RX ORDER — ATORVASTATIN CALCIUM 20 MG/1
20 TABLET, FILM COATED ORAL EVERY EVENING
COMMUNITY
End: 2017-12-28

## 2017-11-07 RX ORDER — SODIUM CHLORIDE 9 MG/ML
1000 INJECTION, SOLUTION INTRAVENOUS ONCE
Status: COMPLETED | OUTPATIENT
Start: 2017-11-07 | End: 2017-11-07

## 2017-11-07 RX ORDER — SODIUM CHLORIDE 9 MG/ML
1000 INJECTION, SOLUTION INTRAVENOUS
Status: DISCONTINUED | OUTPATIENT
Start: 2017-11-07 | End: 2017-11-13 | Stop reason: HOSPADM

## 2017-11-07 RX ORDER — QUETIAPINE FUMARATE 25 MG/1
50 TABLET, FILM COATED ORAL
Status: DISCONTINUED | OUTPATIENT
Start: 2017-11-07 | End: 2017-11-13 | Stop reason: HOSPADM

## 2017-11-07 RX ORDER — AMOXICILLIN 250 MG
2 CAPSULE ORAL 2 TIMES DAILY
Status: DISCONTINUED | OUTPATIENT
Start: 2017-11-07 | End: 2017-11-13 | Stop reason: HOSPADM

## 2017-11-07 RX ORDER — ENALAPRILAT 1.25 MG/ML
0.62 INJECTION INTRAVENOUS EVERY 8 HOURS PRN
Status: DISCONTINUED | OUTPATIENT
Start: 2017-11-07 | End: 2017-11-13 | Stop reason: HOSPADM

## 2017-11-07 RX ORDER — DONEPEZIL HYDROCHLORIDE 23 MG/1
23 TABLET, FILM COATED ORAL EVERY EVENING
COMMUNITY
End: 2017-12-28

## 2017-11-07 RX ORDER — DONEPEZIL HYDROCHLORIDE 23 MG/1
23 TABLET, FILM COATED ORAL EVERY EVENING
Status: DISCONTINUED | OUTPATIENT
Start: 2017-11-07 | End: 2017-11-07

## 2017-11-07 RX ORDER — MEMANTINE HYDROCHLORIDE 10 MG/1
5 TABLET ORAL 2 TIMES DAILY
Status: DISCONTINUED | OUTPATIENT
Start: 2017-11-08 | End: 2017-11-13 | Stop reason: HOSPADM

## 2017-11-07 RX ORDER — ATORVASTATIN CALCIUM 20 MG/1
20 TABLET, FILM COATED ORAL EVERY EVENING
Status: DISCONTINUED | OUTPATIENT
Start: 2017-11-07 | End: 2017-11-13 | Stop reason: HOSPADM

## 2017-11-07 RX ORDER — POLYETHYLENE GLYCOL 3350 17 G/17G
1 POWDER, FOR SOLUTION ORAL
Status: DISCONTINUED | OUTPATIENT
Start: 2017-11-07 | End: 2017-11-13 | Stop reason: HOSPADM

## 2017-11-07 RX ADMIN — QUETIAPINE FUMARATE 50 MG: 25 TABLET ORAL at 21:33

## 2017-11-07 RX ADMIN — ENALAPRILAT 0.62 MG: 1.25 INJECTION INTRAVENOUS at 17:28

## 2017-11-07 RX ADMIN — POTASSIUM CHLORIDE AND SODIUM CHLORIDE: 900; 150 INJECTION, SOLUTION INTRAVENOUS at 17:02

## 2017-11-07 RX ADMIN — SODIUM CHLORIDE 1836 ML: 9 INJECTION, SOLUTION INTRAVENOUS at 17:22

## 2017-11-07 RX ADMIN — STANDARDIZED SENNA CONCENTRATE AND DOCUSATE SODIUM 2 TABLET: 8.6; 5 TABLET, FILM COATED ORAL at 21:33

## 2017-11-07 RX ADMIN — DONEPEZIL HYDROCHLORIDE 20 MG: 5 TABLET, FILM COATED ORAL at 21:32

## 2017-11-07 RX ADMIN — TAZOBACTAM SODIUM AND PIPERACILLIN SODIUM 3.38 G: 375; 3 INJECTION, SOLUTION INTRAVENOUS at 23:56

## 2017-11-07 RX ADMIN — TAZOBACTAM SODIUM AND PIPERACILLIN SODIUM 3.38 G: 375; 3 INJECTION, SOLUTION INTRAVENOUS at 17:11

## 2017-11-07 RX ADMIN — SODIUM CHLORIDE 1000 ML: 9 INJECTION, SOLUTION INTRAVENOUS at 14:52

## 2017-11-07 RX ADMIN — ATORVASTATIN CALCIUM 20 MG: 20 TABLET, FILM COATED ORAL at 21:33

## 2017-11-07 ASSESSMENT — ENCOUNTER SYMPTOMS
DIARRHEA: 0
FEVER: 0
VOMITING: 1
LOSS OF CONSCIOUSNESS: 1

## 2017-11-07 ASSESSMENT — PAIN SCALES - GENERAL: PAINLEVEL_OUTOF10: 0

## 2017-11-07 NOTE — ED NOTES
.  Chief Complaint   Patient presents with   • ALOC     less alert than baseline   • N/V     pta    pt found to be unresponsive, difficult to arouse sitting up at group home. Pt had episode of n/v, undigested food. Pt has pin point pupils.   Pt now awakens to touch, and voice baseline dementia. Per daughter pt Anxiety medications were increased recently by pcp.

## 2017-11-07 NOTE — ED NOTES
Med rec updated and complete.  Allergies reviewed .  Placed call to group home to verify last doses given  Pt received  All morning doses today.  Caregiver stated no antibiotics in last 30 days.

## 2017-11-07 NOTE — ED PROVIDER NOTES
ED Provider Note    Scribed for Deon Gonzáles M.D. by Joesph Gary. 11/7/2017, 1:06 PM.    Primary care provider: Vasquez Mary M.D.  Means of arrival: EMS  History obtained from: family   History limited by: dementia    CHIEF COMPLAINT  Chief Complaint   Patient presents with   • ALOC     less alert than baseline   • N/V     pta        HPI  Nena Lamb is a 77 y.o. female who presents to the Emergency Department for evaluation of altered level of consciousness just prior to arrival. Patient was at group home where she was napping and unable to be roused by staff. The patient began vomiting and EMS was called. Daughter states that the patient seems normal in the ED, but tired. She reports a history of similar episodes. Patient went to her primary last week where her Ativan was doubled to 0.5 mg  Patient has a history of dementia. Daughter denies fever, diarrhea.     Further HPI cannot be obtained due to the patient's dementia.    REVIEW OF SYSTEMS  Review of Systems   Unable to perform ROS: Mental acuity   Constitutional: Negative for fever.   Gastrointestinal: Positive for vomiting. Negative for diarrhea.   Neurological: Positive for loss of consciousness.   Further ROS cannot be obtained due to the patient's dementia.    C.    PAST MEDICAL HISTORY   has a past medical history of Dementia (11/17/2014); Diet-controlled diabetes mellitus (CMS-HCC) (11/1/2017); Hyperlipidemia (11/17/2014); Hypertension; and Insomnia (4/8/2016).    SURGICAL HISTORY   has a past surgical history that includes abdominal hysterectomy total.    SOCIAL HISTORY  Social History   Substance Use Topics   • Smoking status: Former Smoker     Packs/day: 1.00     Years: 20.00   • Smokeless tobacco: Never Used   • Alcohol use No      History   Drug Use No       FAMILY HISTORY  Family History   Problem Relation Age of Onset   • Cancer Mother      colon   • Cancer Father      lung   • Diabetes Paternal Grandmother    • Stroke Neg Hx    •  "Heart Attack Neg Hx        CURRENT MEDICATIONS    Current Facility-Administered Medications:   •  cefTRIAXone (ROCEPHIN) injection 1 g, 1 g, Intravenous, Once, Deon Gonzáles M.D.    Current Outpatient Prescriptions:   •  atorvastatin (LIPITOR) 20 MG Tab, Take 20 mg by mouth every evening., Disp: , Rfl:   •  Donepezil HCl 23 MG Tab, Take 23 mg by mouth every evening., Disp: , Rfl:   •  quetiapine (SEROQUEL) 50 MG tablet, Take once tablet in the evening, Disp: 90 Tab, Rfl: 3  •  Cyanocobalamin (VITAMIN B-12) 1000 MCG Tab, Take 1 Tab by mouth every day., Disp: 90 Tab, Rfl: 3  •  lisinopril-hydrochlorothiazide (PRINZIDE, ZESTORETIC) 20-25 MG per tablet, Take 1 Tab by mouth every day., Disp: , Rfl:   •  lorazepam (ATIVAN) 0.5 MG Tab, Take 0.5 mg by mouth every four hours as needed for Anxiety., Disp: , Rfl:   •  ibuprofen (MOTRIN) 600 MG Tab, Take 600 mg by mouth every 6 hours as needed., Disp: , Rfl:   •  Memantine HCl ER (NAMENDA XR) 14 MG CAPSULE SR 24 HR, Take 1 Capsule by mouth every day., Disp: 90 Cap, Rfl: 1  •  potassium chloride SA (KDUR) 20 MEQ Tab CR, TAKE (1) TABLET BY MOUTH TWICE DAILY., Disp: 180 Tab, Rfl: 1  •  omeprazole (PRILOSEC) 20 MG delayed-release capsule, TAKE (1) CAPSULE BY MOUTH ONCE DAILY., Disp: 90 Cap, Rfl: 1  •  aspirin EC (ECOTRIN) 81 MG Tablet Delayed Response, Take 81 mg by mouth every day., Disp: , Rfl:   •  polyethylene glycol/lytes (MIRALAX) Pack, Take 17 g by mouth every day., Disp: , Rfl:   •  cetirizine (ZYRTEC) 10 MG Tab, Take 10 mg by mouth every day., Disp: , Rfl:     ALLERGIES  No Known Allergies    PHYSICAL EXAM  VITAL SIGNS: /48   Pulse 98   Temp 36.3 °C (97.3 °F)   Resp 14   Ht 1.702 m (5' 7\")   Wt 61.2 kg (135 lb)   BMI 21.14 kg/m²     Constitutional:  No acute distress. Somnolent.   HENT: Slightly dry mucous membranes  Eyes:  No conjunctivitis or icterus  Neck:  trachea is midline, no palpable thyroid  Lymphatic:  No cervical lymphadenopathy  Cardiovascular:  " "Regular rate and rhythm, no murmurs  Thorax & Lungs:  Bilateral rhonchi. Normal work of breathing.  Abdomen:  Soft, Non-tender  Skin:.  no rash  Back:  Non-tender, no CVA tenderness  Extremities:   no edema  Vascular:  symmetric radial pulse  Neurologic:  Normal gross motor    LABS  Labs Reviewed   CBC WITH DIFFERENTIAL - Abnormal; Notable for the following:        Result Value    MCHC 32.6 (*)     Neutrophils-Polys 84.40 (*)     Lymphocytes 9.60 (*)     Lymphs (Absolute) 0.75 (*)     All other components within normal limits   COMP METABOLIC PANEL - Abnormal; Notable for the following:     Co2 19 (*)     Anion Gap 12.0 (*)     Glucose 178 (*)     All other components within normal limits   LACTIC ACID - Abnormal; Notable for the following:     Lactic Acid 4.4 (*)     All other components within normal limits   URINALYSIS,CULTURE IF INDICATED - Abnormal; Notable for the following:     Character Turbid (*)     Glucose 100 (*)     Protein 30 (*)     Nitrite Positive (*)     Leukocyte Esterase Moderate (*)     Occult Blood Trace (*)     All other components within normal limits   ESTIMATED GFR - Abnormal; Notable for the following:     GFR If Non  57 (*)     All other components within normal limits   URINE MICROSCOPIC (W/UA) - Abnormal; Notable for the following:     RBC 2-5 (*)     Bacteria Many (*)     Epithelial Cells Many (*)     All other components within normal limits   TROPONIN   LIPASE   BLOOD CULTURE    Narrative:     Per Hospital Policy: Only change Specimen Src: to \"Line\" if  specified by physician order.   BLOOD CULTURE    Narrative:     Per Hospital Policy: Only change Specimen Src: to \"Line\" if  specified by physician order.   URINE CULTURE(NEW)   All labs reviewed by me.    EKG Interpretation  Interpreted by me  Normal Sinus Rhythm. Rate 77  Normal QTc  Normal QRS  Normal Axis    RADIOLOGY  CT-HEAD W/O   Final Result      1.  Cerebral atrophy.      2.  White matter lucencies most " consistent with small vessel ischemic change versus demyelination or gliosis.      3.  Considerable motion and misregistration artifact on the images. No intracranial mass effect or acute hemorrhage identified.      DX-CHEST-PORTABLE (1 VIEW)   Final Result      Right mid and lower lung zone airspace opacity may represent mild atelectasis or pneumonitis.      Increased bibasilar opacities likely represent hypoinflation atelectasis in the setting of lower lung volumes achieved on the current exam. Bibasilar pneumonia not excluded.      Atherosclerotic plaque.      The radiologist's interpretation of all radiological studies have been reviewed by me.    COURSE & MEDICAL DECISION MAKING  Pertinent Labs & Imaging studies reviewed. (See chart for details)    1:06 PM - Patient seen and examined at bedside. I informed the patient's family that we will cast a wide net to rule out any acute processes that would pose an immediate risk. At this time her condition is likely a cause of her change in Ativan dose. Discussed the route and elimination of Ativan from the body with her family. Daughter verbalizes understanding and agrees to diagnostics. Ordered DX chest, CT head, CBC with differential, CMP, Troponin, Lipase, Lactic acid, Blood culture x2, Urinalysis, culture to evaluate her symptoms. The differential diagnoses include but are not limited to: medication reaction, altered mental status; rule out emergent process.      2:30 PM - Lactic acid 4.4. I ordered for remaining sepsis protocol to be initiated. Patient will be treated with NS 1000 ml for fluid resuscitation and possible sepsis.     2:59 PM - I discussed the patient's case and the above findings with Dr. Whitaker (hospitalist) who agrees to admit the patient.     Patient has lactic acidosis and UTI. She is given a liter fluid for possible sepsis. She's been admitted. I spent more 30 minutes critical care time with this patient    DISPOSITION:  Patient will be  admitted to hospital in guarded condition.      FINAL IMPRESSION  1. Altered level of consciousness    2. Lactic acidosis     Critical care 30 minutes     IJoesph (Scribe), am scribing for, and in the presence of, Deon Gonzáles M.D..    Electronically signed by: Joesph Gary (Scribe), 11/7/2017    IDeon M.D. personally performed the services described in this documentation, as scribed by Joesph Gary in my presence, and it is both accurate and complete.    The note accurately reflects work and decisions made by me.  Deon Gonzáles  11/7/2017  9:03 PM

## 2017-11-07 NOTE — ED NOTES
from Lab called with critical result of lactic 4.4 at 1409. Critical lab result read back.   Dr. leblanc notified of critical lab result at 1415.  Critical lab result read back by

## 2017-11-08 PROBLEM — N39.0 URINARY TRACT INFECTION: Status: ACTIVE | Noted: 2017-11-08

## 2017-11-08 PROBLEM — Z66 DNR (DO NOT RESUSCITATE): Status: ACTIVE | Noted: 2017-11-08

## 2017-11-08 LAB
ANION GAP SERPL CALC-SCNC: 8 MMOL/L (ref 0–11.9)
BASOPHILS # BLD AUTO: 0.7 % (ref 0–1.8)
BASOPHILS # BLD: 0.04 K/UL (ref 0–0.12)
BUN SERPL-MCNC: 11 MG/DL (ref 8–22)
CALCIUM SERPL-MCNC: 7.9 MG/DL (ref 8.5–10.5)
CHLORIDE SERPL-SCNC: 111 MMOL/L (ref 96–112)
CO2 SERPL-SCNC: 20 MMOL/L (ref 20–33)
CREAT SERPL-MCNC: 0.77 MG/DL (ref 0.5–1.4)
EOSINOPHIL # BLD AUTO: 0.12 K/UL (ref 0–0.51)
EOSINOPHIL NFR BLD: 2 % (ref 0–6.9)
ERYTHROCYTE [DISTWIDTH] IN BLOOD BY AUTOMATED COUNT: 43.8 FL (ref 35.9–50)
GFR SERPL CREATININE-BSD FRML MDRD: >60 ML/MIN/1.73 M 2
GLUCOSE SERPL-MCNC: 136 MG/DL (ref 65–99)
HCT VFR BLD AUTO: 31.3 % (ref 37–47)
HGB BLD-MCNC: 10.2 G/DL (ref 12–16)
IMM GRANULOCYTES # BLD AUTO: 0.02 K/UL (ref 0–0.11)
IMM GRANULOCYTES NFR BLD AUTO: 0.3 % (ref 0–0.9)
LACTATE BLD-SCNC: 1.5 MMOL/L (ref 0.5–2)
LYMPHOCYTES # BLD AUTO: 1.45 K/UL (ref 1–4.8)
LYMPHOCYTES NFR BLD: 23.6 % (ref 22–41)
MCH RBC QN AUTO: 28.8 PG (ref 27–33)
MCHC RBC AUTO-ENTMCNC: 32.6 G/DL (ref 33.6–35)
MCV RBC AUTO: 88.4 FL (ref 81.4–97.8)
MONOCYTES # BLD AUTO: 0.58 K/UL (ref 0–0.85)
MONOCYTES NFR BLD AUTO: 9.4 % (ref 0–13.4)
NEUTROPHILS # BLD AUTO: 3.94 K/UL (ref 2–7.15)
NEUTROPHILS NFR BLD: 64 % (ref 44–72)
NRBC # BLD AUTO: 0 K/UL
NRBC BLD AUTO-RTO: 0 /100 WBC
PLATELET # BLD AUTO: 188 K/UL (ref 164–446)
PMV BLD AUTO: 9.6 FL (ref 9–12.9)
POTASSIUM SERPL-SCNC: 3.8 MMOL/L (ref 3.6–5.5)
RBC # BLD AUTO: 3.54 M/UL (ref 4.2–5.4)
SODIUM SERPL-SCNC: 139 MMOL/L (ref 135–145)
WBC # BLD AUTO: 6.2 K/UL (ref 4.8–10.8)

## 2017-11-08 PROCEDURE — 83605 ASSAY OF LACTIC ACID: CPT

## 2017-11-08 PROCEDURE — 80048 BASIC METABOLIC PNL TOTAL CA: CPT

## 2017-11-08 PROCEDURE — 85025 COMPLETE CBC W/AUTO DIFF WBC: CPT

## 2017-11-08 PROCEDURE — 700101 HCHG RX REV CODE 250: Performed by: HOSPITALIST

## 2017-11-08 PROCEDURE — 770001 HCHG ROOM/CARE - MED/SURG/GYN PRIV*

## 2017-11-08 PROCEDURE — A9270 NON-COVERED ITEM OR SERVICE: HCPCS | Performed by: HOSPITALIST

## 2017-11-08 PROCEDURE — A9270 NON-COVERED ITEM OR SERVICE: HCPCS | Performed by: PHARMACIST

## 2017-11-08 PROCEDURE — 700102 HCHG RX REV CODE 250 W/ 637 OVERRIDE(OP): Performed by: HOSPITALIST

## 2017-11-08 PROCEDURE — 700102 HCHG RX REV CODE 250 W/ 637 OVERRIDE(OP): Performed by: PHARMACIST

## 2017-11-08 PROCEDURE — 700111 HCHG RX REV CODE 636 W/ 250 OVERRIDE (IP): Performed by: HOSPITALIST

## 2017-11-08 RX ADMIN — ASPIRIN 81 MG: 81 TABLET, COATED ORAL at 09:18

## 2017-11-08 RX ADMIN — QUETIAPINE FUMARATE 50 MG: 25 TABLET ORAL at 20:22

## 2017-11-08 RX ADMIN — DONEPEZIL HYDROCHLORIDE 20 MG: 5 TABLET, FILM COATED ORAL at 20:22

## 2017-11-08 RX ADMIN — ATORVASTATIN CALCIUM 20 MG: 20 TABLET, FILM COATED ORAL at 20:22

## 2017-11-08 RX ADMIN — POTASSIUM CHLORIDE AND SODIUM CHLORIDE: 900; 150 INJECTION, SOLUTION INTRAVENOUS at 02:13

## 2017-11-08 RX ADMIN — TAZOBACTAM SODIUM AND PIPERACILLIN SODIUM 3.38 G: 375; 3 INJECTION, SOLUTION INTRAVENOUS at 13:42

## 2017-11-08 RX ADMIN — TAZOBACTAM SODIUM AND PIPERACILLIN SODIUM 3.38 G: 375; 3 INJECTION, SOLUTION INTRAVENOUS at 23:12

## 2017-11-08 RX ADMIN — MEMANTINE HYDROCHLORIDE 5 MG: 10 TABLET ORAL at 20:22

## 2017-11-08 RX ADMIN — POTASSIUM CHLORIDE AND SODIUM CHLORIDE: 900; 150 INJECTION, SOLUTION INTRAVENOUS at 23:01

## 2017-11-08 RX ADMIN — ENOXAPARIN SODIUM 40 MG: 100 INJECTION SUBCUTANEOUS at 09:18

## 2017-11-08 RX ADMIN — STANDARDIZED SENNA CONCENTRATE AND DOCUSATE SODIUM 2 TABLET: 8.6; 5 TABLET, FILM COATED ORAL at 09:18

## 2017-11-08 RX ADMIN — POTASSIUM CHLORIDE AND SODIUM CHLORIDE: 900; 150 INJECTION, SOLUTION INTRAVENOUS at 13:42

## 2017-11-08 RX ADMIN — TAZOBACTAM SODIUM AND PIPERACILLIN SODIUM 3.38 G: 375; 3 INJECTION, SOLUTION INTRAVENOUS at 05:44

## 2017-11-08 RX ADMIN — TAZOBACTAM SODIUM AND PIPERACILLIN SODIUM 3.38 G: 375; 3 INJECTION, SOLUTION INTRAVENOUS at 18:16

## 2017-11-08 RX ADMIN — MEMANTINE HYDROCHLORIDE 5 MG: 10 TABLET ORAL at 09:18

## 2017-11-08 ASSESSMENT — PAIN SCALES - GENERAL
PAINLEVEL_OUTOF10: 0
PAINLEVEL_OUTOF10: 0

## 2017-11-08 NOTE — DOCUMENTATION QUERY
"DOCUMENTATION QUERY    PROVIDERS: Please select “Cosign w/ note”to reply to query.    To better represent the severity of illness of your patient, please review the following information and exercise your independent professional judgment in responding to this query.     \"Altered mentation from baseline dementia secondary to sepsis\" is documented in the Consult Note. Based upon the clinical findings, risk factors, and treatment, can this diagnosis be further specified?    • Acute encephalopathy (if so, please specify type [metabolic, hepatic, toxic, alcoholic, etc.])  • Psychosis (if so, please specify type [acute hysterical, alcoholic, etc.])  • Delirium (if so, please specify underlying cause [alcohol intoxication, alcohol withdrawal, multiple etiologies, unknown etiology])   • Other explanation of clinical findings  • Unable to determine    The medical record reflects the following:   Clinical Findings Sepsis; UTI  11/7 Lactic Acid: 4.4  11/7 Urine Culture: Positive with lactose fermenting gram negative samson >100,000   Treatment IVNS; Zosyn   Risk Factors Age; sepsis; UTI; dementia; lactic acidosis   Location within medical record Consult Note, History and Physical and Lab Results     Thank you,   Natalie Griffith RN  Clinical   [phone]          "

## 2017-11-08 NOTE — PROGRESS NOTES
Bedside report received from ELIZA Downey RN.  Patient has baseline dementia, unable to complete neuro assessment.  Patient transported from R10 to S114 with ACLS RN and CCT.

## 2017-11-08 NOTE — PROGRESS NOTES
Pulmonary Critical Care Progress Note        DOS: 11/8/17    History of Present Illness:  The patient is a 77-year-old woman, who presented   to the emergency room today for altered LOC.  She resides at a local nursing   facility and suffers from significant dementia.  The group home staff   apparently found her somnolent, poorly arousable.  She subsequently began   having vomiting, and emergency medical services were contacted.  I did not   speak with family directly but spoke with emergency room physician, and on   arrival to the ER, she was fatigued, but her LOC was near normal per family.    There apparently have been similar episodes in the past.  Recent evaluation by   primary care physician prompted adjustment of Ativan for anxiety and mood   management.  The Ativan dose apparently has been doubled.  There is no clear   documented history of fever, chills, sweats, diarrhea, bleeding, URI, or other   focal respiratory symptoms.  The patient had a normal white count, but a   low-grade temperature of 100.9.  Urinalysis was extremely abnormal consistent   with probable UTI.  Patient may have had some CVAT.  The lactic acid actually   was measured from a sepsis evaluation standpoint.  Her initial lactic acid was   actually 4.4.  Fluid resuscitation initiated a repeat lactic, came down to   2.6.  She does not have significant hypotension.  She was seen by Dr. Whitaker   for admission purposes and subsequently transferred from the ER to the ICU for  further care.  On arrival to the ICU, I saw her and she was pleasant, confused, hypertensive,  and not a very good historian.  Most of the history is taken from ER staff,   Dr. Whitaker, the records, and the EHR.    ROS:    Respiratory: unable to perform due to the patient's inability to effectively communicate,   Cardiac: unable to perform due to the patient's inability to effectively communicate,   GI: unable to perform due to the patient's inability to effectively  communicate.      Interval Events:  24 hour interval history reviewed   Reason for visit:  Altered LOC, urosepsis    Afebrile  Alert and confused - oriented to self - does follow  Hemodynamics  Almaguer - UO ok  Mobilizing  Lactic normal  Bp better - one PRN Bp med overnight  I/Os +2.2 liters/admit  Room air  Urine C/s pending - lactose posiive GNR, BC neg so far  CXR 11/7 BB atelectasis    PFSH:  No change.    Respiratory:     Pulse Oximetry: 98 %  Room air  WOB ok          Exam: unlabored respirations, no intercostal retractions or accessory muscle use and clear to auscultation without rales or wheezes  ImagingCXR  I have personally reviewed the chest x-ray my impression is  noted above and films are reviewed with Team on rounds           Invalid input(s): [id number]    HemoDynamics:  Pulse: 69, Heart Rate (Monitored): 69  Blood Pressure : 112/48, NIBP: (!) 99/53       Exam: regular rhythm (Sinus), no ectopy  Imaging: Available data reviewed  Recent Labs      11/07/17   1348   TROPONINI  <0.01       Neuro:  GCS Total [place] Coma Score: 14       Exam: no focal deficits noted Confused  Imaging: Ct Brain Reviewed   11/7  1.  Cerebral atrophy.  2.  White matter lucencies most consistent with small vessel ischemic change versus demyelination or gliosis.  3.  Considerable motion and misregistration artifact on the images. No intracranial mass effect or acute hemorrhage identified.      Fluids:  Intake/Output       11/06/17 0700 - 11/07/17 0659 (Not Admitted) 11/07/17 0700 - 11/08/17 0659 11/08/17 0700 - 11/09/17 0659      6375-1780 2435-4571 Total 8820-0505 2760-9021 Total 6444-9473 0133-6779 Total       Intake    I.V.  --  -- --  1300  2150 3450  --  -- --    IV Piggyback Volume (IV Piggyback) -- -- -- 100 100 200 -- -- --    IV Volume (NS with 20 KCl) -- -- -- 200 1250 1450 -- -- --    IV Volume (NS BOLUS) -- -- -- 5370 086 1408 -- -- --    Total Intake -- -- -- 1300 2150 3450 -- -- --       Output    Urine  --  --  --  300  975 1275  --  -- --    Indwelling Cathether -- -- --  -- -- --    Total Output -- -- --  -- -- --       Net I/O     -- -- -- 1000 1175 2175 -- -- --        Weight: 64.5 kg (142 lb 3.2 oz)  Recent Labs      17   1348  17   0220   SODIUM  137  139   POTASSIUM  3.9  3.8   CHLORIDE  106  111   CO2  19*  20   BUN  16  11   CREATININE  0.94  0.77   CALCIUM  9.2  7.9*       GI/Nutrition:  Exam: abdomen is soft and non-tender, normal active bowel sounds, no CVAT  Imaging: Available data reviewed  taking PO  Liver Function  Recent Labs      17   1348  17   0220   ALTSGPT  11   --    ASTSGOT  12   --    ALKPHOSPHAT  85   --    TBILIRUBIN  0.4   --    LIPASE  68   --    GLUCOSE  178*  136*       Heme:  Recent Labs      17   1348   RBC  4.47   HEMOGLOBIN  13.0   HEMATOCRIT  39.9   PLATELETCT  240       Infectious Disease:  Monitored Temp  Av.7 °C (99.8 °F)  Min: 36.9 °C (98.4 °F)  Max: 38.4 °C (101.1 °F)  Temp  Av.8 °C (98.3 °F)  Min: 36.3 °C (97.3 °F)  Max: 37.4 °C (99.3 °F)  Micro: antibiotics reviewed and cultures pending   Urine + for GNR lactose +  Recent Labs      17   1348   WBC  7.8   NEUTSPOLYS  84.40*   LYMPHOCYTES  9.60*   MONOCYTES  4.70   EOSINOPHILS  0.50   BASOPHILS  0.40   ASTSGOT  12   ALTSGPT  11   ALKPHOSPHAT  85   TBILIRUBIN  0.4     Current Facility-Administered Medications   Medication Dose Frequency Provider Last Rate Last Dose   • aspirin EC (ECOTRIN) tablet 81 mg  81 mg DAILY Carlos Whitaker M.D.       • atorvastatin (LIPITOR) tablet 20 mg  20 mg Q EVENING Carlos Whitaker M.D.   20 mg at 17   • quetiapine (SEROQUEL) tablet 50 mg  50 mg QHS Carlos Whitaker M.D.   50 mg at 17   • senna-docusate (PERICOLACE or SENOKOT S) 8.6-50 MG per tablet 2 Tab  2 Tab BID Carlos Whitaker M.D.   2 Tab at 17    And   • polyethylene glycol/lytes (MIRALAX) PACKET 1 Packet  1 Packet QDAY PRN Carlos Whitaker M.D.         And   • magnesium hydroxide (MILK OF MAGNESIA) suspension 30 mL  30 mL QDAY PRN Carlos Whitaker M.D.        And   • bisacodyl (DULCOLAX) suppository 10 mg  10 mg QDAY PRN Carlos Whitaker M.D.       • Respiratory Care per Protocol   Continuous RT Carlos Whitaker M.D.       • 0.9 % NaCl with KCl 20 mEq infusion   Continuous Carlos Whitaker M.D. 125 mL/hr at 11/08/17 0213     • NS (BOLUS) infusion 1,000 mL  1,000 mL Once PRN Carlos Whitaker M.D.       • enoxaparin (LOVENOX) inj 40 mg  40 mg DAILY Carlos Whitakre M.D.       • piperacillin-tazobactam (ZOSYN) IVPB premix 3.375 g  3.375 g Q6HRS Carlos Whitaker M.D.   Stopped at 11/08/17 0026   • donepezil (ARICEPT) tablet 20 mg  20 mg Q EVENING Carlos Whitaker M.D.   20 mg at 11/07/17 2132   • memantine (NAMENDA) tablet 5 mg  5 mg BID Lizett Mahoney, PharmD       • enalaprilat (VASOTEC) injection 0.625 mg  0.625 mg Q8HRS PRN Vikas Villa M.D.   0.625 mg at 11/07/17 1728     Last reviewed on 11/7/2017  3:18 PM by Jia Logan    Quality  Measures:  Labs reviewed, Radiology images reviewed, Medications reviewed and EKG reviewed                Assessed for rehab: Patient was assess for and/or received rehabilitation services during this hospitalization      Problems/plan:    Sepsis syndrome without hypotension, probable urinary source.  Eval for pyelonephritis.   IV Zosyn   De-escalate to C3 tomorrow? prob E coli   Monitor for need for U/s  Altered mentation from baseline dementia secondary to sepsis.     Negative CT head, no meningeal signs.   LOC better today - baseline  Lactic acidosis with elevated anion gap secondary to above.   Resolved  Type 2 diabetes, on therapy with elevated blood sugars.   Oral agent - SSI - diet  History of systemic hypertension.  Blood pressure actually elevated.   Resume home meds   PRN enalapril  Probable dehydration, clinically by exam and suspect poor p.o. intake for the last few days.   Better with fluids  History of significant dementia,  residing in a group home.  Dyslipidemia historically - diet - statin  Former smoker without history of chronic obstructive pulmonary disease.   RT protocols PRN  Abnormal chest x-ray, bibasilar atelectasis, suspected over pneumonic abnormalities   IS, mobilize  DNR  Transfer to medical - orders in    Discussed patient condition and risk of morbidity and/or mortality with RN, RT, Pharmacy, Dietary,  and Charge nurse / hot rounds.

## 2017-11-08 NOTE — ASSESSMENT & PLAN NOTE
I discussed Mrs. Lamb's code status with her daughter,  and she states that her mother has a known DNR status.

## 2017-11-08 NOTE — ASSESSMENT & PLAN NOTE
- Urine cx positive for E. Coli.  Mentation has improved back to baseline, but she remains impaired due to baseline dementia.  - Continue ceftriaxone.   - Afebrile.  - WBC increased today, but no complaints of worsening symptoms

## 2017-11-08 NOTE — CARE PLAN
Problem: Safety  Goal: Will remain free from falls  Bed locked and in lowest position. Call light within reach and pt educated on use. Room near nurses station. Bed alarm activated. Non-slid socks in place.     Problem: Skin Integrity  Goal: Risk for impaired skin integrity will decrease  RN to turn pt q2h and PRN. Bony prominences padded. Heels floated.

## 2017-11-08 NOTE — H&P
Hospital Medicine History and Physical    Date of Service  11/7/2017    Chief Complaint  Chief Complaint   Patient presents with   • ALOC     less alert than baseline   • N/V     pta        History of Presenting Illness  77 y.o. female who presented 11/7/2017 with Decreased level of consciousness. Ms. Lamb has a history of dementia and hypertension who was found to be altered and decreased level of consciousness at her group home. Here in the emergency room she has tachycardia and right ureters. Lactic acid is 4.4 and she has a urinary tract infection with hypotension which is consistent with septic shock. She'll be admitted to the intensive care unit with IV fluids and IV antibiotics. I did speak with her daughter, , and she confirmed that her mother has a DNR status. I have contacted Dr. Villa, critical care, as she will be in the intensive care unit.  Primary Care Physician  Vasquez Mary M.D.    Consultants  Critical care    Code Status  PAUL    Review of Systems  Review of Systems   Unable to perform ROS: Mental acuity        Past Medical History  Past Medical History:   Diagnosis Date   • Diet-controlled diabetes mellitus (CMS-HCC) 11/1/2017   • Insomnia 4/8/2016   • Dementia 11/17/2014   • Hyperlipidemia 11/17/2014   • Hypertension        Surgical History  Past Surgical History:   Procedure Laterality Date   • ABDOMINAL HYSTERECTOMY TOTAL      heavy periods       Medications  No current facility-administered medications on file prior to encounter.      Current Outpatient Prescriptions on File Prior to Encounter   Medication Sig Dispense Refill   • quetiapine (SEROQUEL) 50 MG tablet Take once tablet in the evening 90 Tab 3   • Cyanocobalamin (VITAMIN B-12) 1000 MCG Tab Take 1 Tab by mouth every day. 90 Tab 3   • lisinopril-hydrochlorothiazide (PRINZIDE, ZESTORETIC) 20-25 MG per tablet Take 1 Tab by mouth every day.     • lorazepam (ATIVAN) 0.5 MG Tab Take 0.5 mg by mouth every four hours as needed  for Anxiety.     • ibuprofen (MOTRIN) 600 MG Tab Take 600 mg by mouth every 6 hours as needed.     • Memantine HCl ER (NAMENDA XR) 14 MG CAPSULE SR 24 HR Take 1 Capsule by mouth every day. 90 Cap 1   • potassium chloride SA (KDUR) 20 MEQ Tab CR TAKE (1) TABLET BY MOUTH TWICE DAILY. 180 Tab 1   • omeprazole (PRILOSEC) 20 MG delayed-release capsule TAKE (1) CAPSULE BY MOUTH ONCE DAILY. 90 Cap 1   • aspirin EC (ECOTRIN) 81 MG Tablet Delayed Response Take 81 mg by mouth every day.     • polyethylene glycol/lytes (MIRALAX) Pack Take 17 g by mouth every day.     • cetirizine (ZYRTEC) 10 MG Tab Take 10 mg by mouth every day.         Family History  Family History   Problem Relation Age of Onset   • Cancer Mother      colon   • Cancer Father      lung   • Diabetes Paternal Grandmother    • Stroke Neg Hx    • Heart Attack Neg Hx        Social History  Social History   Substance Use Topics   • Smoking status: Former Smoker     Packs/day: 1.00     Years: 20.00   • Smokeless tobacco: Never Used   • Alcohol use No       Allergies  No Known Allergies     Physical Exam  Laboratory   Hemodynamics  Temp (24hrs), Av.8 °C (98.3 °F), Min:36.3 °C (97.3 °F), Max:37.4 °C (99.3 °F)   Temperature: 37.4 °C (99.3 °F), Monitored Temp: 38.3 °C (100.9 °F)  Pulse  Av.3  Min: 94  Max: 120 Heart Rate (Monitored): (!) 107  Blood Pressure : 112/48, NIBP: 160/67      Respiratory      Respiration: (!) 32, Pulse Oximetry: 94 %, O2 Daily Delivery Respiratory : Room Air with O2 Available     Work Of Breathing / Effort: Mild  RUL Breath Sounds: Clear, RML Breath Sounds: Diminished, RLL Breath Sounds: Diminished, RANDY Breath Sounds: Clear, LLL Breath Sounds: Diminished    Physical Exam   Constitutional: She appears distressed.   She has a rigors   HENT:   Head: Normocephalic and atraumatic.   Neck: Neck supple.   Cardiovascular:   No murmur heard.  Tachycardia without murmurs   Pulmonary/Chest: Effort normal. No respiratory distress. She has no  wheezes.   Abdominal: Soft.   No suprapubic tenderness  No costovertebral angle tenderness to percussion   Genitourinary:   Genitourinary Comments: Almaguer catheter   Musculoskeletal: She exhibits no edema.   Neurological:   She is confused and a non-historian   Skin: Skin is warm and dry. She is not diaphoretic.       Recent Labs      11/07/17   1348   WBC  7.8   RBC  4.47   HEMOGLOBIN  13.0   HEMATOCRIT  39.9   MCV  89.3   MCH  29.1   MCHC  32.6*   RDW  44.0   PLATELETCT  240   MPV  9.8     Recent Labs      11/07/17   1348   SODIUM  137   POTASSIUM  3.9   CHLORIDE  106   CO2  19*   GLUCOSE  178*   BUN  16   CREATININE  0.94   CALCIUM  9.2     Recent Labs      11/07/17   1348   ALTSGPT  11   ASTSGOT  12   ALKPHOSPHAT  85   TBILIRUBIN  0.4   LIPASE  68   GLUCOSE  178*                 Lab Results   Component Value Date    TROPONINI <0.01 11/07/2017     Urinalysis:    Lab Results  Component Value Date/Time   SPECGRAVITY 1.024 11/07/2017 1454   GLUCOSEUR 100 (A) 11/07/2017 1454   KETONES Negative 11/07/2017 1454   NITRITE Positive (A) 11/07/2017 1454   WBCURINE Packed WBC 11/07/2017 1454   RBCURINE 2-5 (A) 11/07/2017 1454   BACTERIA Many (A) 11/07/2017 1454   EPITHELCELL Many (A) 11/07/2017 1454        Imaging  CT-HEAD W/O   Final Result      1.  Cerebral atrophy.      2.  White matter lucencies most consistent with small vessel ischemic change versus demyelination or gliosis.      3.  Considerable motion and misregistration artifact on the images. No intracranial mass effect or acute hemorrhage identified.      DX-CHEST-PORTABLE (1 VIEW)   Final Result      Right mid and lower lung zone airspace opacity may represent mild atelectasis or pneumonitis.      Increased bibasilar opacities likely represent hypoinflation atelectasis in the setting of lower lung volumes achieved on the current exam. Bibasilar pneumonia not excluded.      Atherosclerotic plaque.           Assessment/Plan     I anticipate this patient will  require at least two midnights for appropriate medical management, necessitating inpatient admission.    Septic shock (CMS-HCC)- (present on admission)   Assessment & Plan    This is secondary to urinary tract infection. Her lactic acid is 4.4 in the emergency room.  She will receive IV fluids, IV antibiotics, and further resuscitation in the intensive care unit.        UTI (urinary tract infection)- (present on admission)   Assessment & Plan    Ms. Lamb appears to have recurrent urinary tract infections. In July she had Klebsiella in June she had Proteus and Citrobacter. All of these bacteria were sensitive to Zosyn which will be initiated.        DNR (do not resuscitate)- (present on admission)   Assessment & Plan    I discussed Mrs. Lamb's code status with her daughter,  and she states that her mother has a known DNR status.        Dementia with behavioral disturbance- (present on admission)   Assessment & Plan    She is on Seroquel, Aricept, and Namenda at home all of which will be restarted.        Hyperlipidemia- (present on admission)   Assessment & Plan    She is on a statin outpatient.        Hypertension- (present on admission)   Assessment & Plan    We'll hold on her outpatient lisinopril and hydrochlorothiazide due to hypotension.            VTE prophylaxis: lovenox.

## 2017-11-08 NOTE — CONSULTS
DATE OF SERVICE:  11/07/2017    PULMONARY CRITICAL CARE NOTE    I was asked to see the patient by Dr. Carlos Whitaker.    REASON FOR CONSULTATION:  Sepsis syndrome.    HISTORY OF PRESENT ILLNESS:  The patient is a 77-year-old woman, who presented   to the emergency room today for altered LOC.  She resides at a local nursing   facility and suffers from significant dementia.  The group home staff   apparently found her somnolent, poorly arousable.  She subsequently began   having vomiting, and emergency medical services were contacted.  I did not   speak with family directly but spoke with emergency room physician, and on   arrival to the ER, she was fatigued, but her LOC was near normal per family.    There apparently have been similar episodes in the past.  Recent evaluation by   primary care physician prompted adjustment of Ativan for anxiety and mood   management.  The Ativan dose apparently has been doubled.  There is no clear   documented history of fever, chills, sweats, diarrhea, bleeding, URI, or other   focal respiratory symptoms.  The patient had a normal white count, but a   low-grade temperature of 100.9.  Urinalysis was extremely abnormal consistent   with probable UTI.  Patient may have had some CVAT.  The lactic acid actually   was measured from a sepsis evaluation standpoint.  Her initial lactic acid was   actually 4.4.  Fluid resuscitation initiated a repeat lactic, came down to   2.6.  She does not have significant hypotension.  She was seen by Dr. Whitaker   for admission purposes and subsequently transferred from the ER to the ICU for   further care.    On arrival to the ICU, I saw her and she was pleasant, confused, hypertensive,   and not a very good historian.  Most of the history is taken from ER staff,   Dr. Whitaker, the records, and the EHR.    PAST MEDICAL HISTORY:  Significant for dyslipidemia; diabetes mellitus type 2,   diet controlled; systemic hypertension; insomnia; and dementia.    PAST  SURGICAL HISTORY:  Includes TAHBSO.    ALLERGIES:  NKA.    SOCIAL HISTORY:  Patient is a smoker, pack a day for 20 years, unsure exactly   when she quit.  She is a nonsmoker now.  No history of significant alcohol   use.  No history of substance abuse.    FAMILY HISTORY:  Significant for colon cancer in mother, lung cancer in   father, diabetes in paternal grandfather.    MEDICATIONS:  Reviewed in the med rec form.  Medicines include vitamin B12;    lisinopril/hydrochlorothiazide combination; lorazepam, recently increased dose   as mentioned above; ibuprofen; K-Dur; Prilosec; MiraLax; Zyrtec;   atorvastatin; Aricept; Seroquel; Namenda; Ecotrin.    Current active medications included aspirin, atorvastatin, Aricept,   enoxaparin, Namenda, Zosyn, Seroquel, bowel care protocol, IV fluids.    REVIEW OF SYSTEMS:  Not obtained from the patient.  She is conversant, follows   simple commands, but really does not answer any questions in a cogent   fashion.    PHYSICAL EXAMINATION:  VITAL SIGNS:  T-max again 38.3 or 100.9 since arrival.  Heart rate around   100-110, sinus tachycardia with a respiratory rate of 20-30 when I first her   in the ICU.  Blood pressure running 160-200 range and right after she got   there and had a Almaguer placed, she is a bit agitated, it did come down, never   been hypotensive.  GENERAL:  She appears her stated age.  She appears acutely and chronically   ill.  HEENT AND NECK:  Her HEENT exam is significant for intact cranial nerves, dry   mouth and mucous membranes, flat neck veins, no JVD, trachea is midline, no   thyromegaly, no meningeal signs.  She was not icteric.  LUNGS:  Her lungs are clear anteriorly, diminished diffusely.  No wheezes,   rales, or rhonchi.  CARDIAC:  Exam reveals tachycardic rate, regular rhythm, distant heart tones.    I did not appreciate a murmur.  ABDOMEN:  Her abdomen was soft, nontender without organomegaly or mass.  Bowel   sounds were present, normal.  BACK:  Revealed  saddle tenderness on the right flank.  Patient is not a   difficult historian, but difficult to exam.  Any kind of tactile stimuli   prompted her to try to push hands away, etc.  Her spine was nontender.  EXTREMITIES:  No cyanosis, clubbing, or edema.  She did have strong rapid   pulses.  She did have a little diminished capillary refill in the lower   extremities.  NEUROLOGICALLY:  Again, mostly nonverbal, did not participate, followed some   commands at times.  Cranial nerves intact.  Motor intact.  Strength 5-/5 and   symmetric.  Reflexes symmetric.  Plantar reflexes downgoing.  Did not get her   out of bed to examine her.    LABORATORY DATA:  White count normal at 7.8, hematocrit 39.9, platelet count   240.  Chemistries; sodium 137, potassium 3.9, chloride 106, bicarbonate 19,   anion gap 12, a little elevated glucose of 178, BUN 16, creatinine 0.94,   calcium 9.2.  Normal transaminases, alkaline phosphatase and bilirubin.    Albumin was 3.5.  Initial lactic acid 4.4.  Troponin I less than 0.01.  Repeat   lactic acid down to 2.6.  Urinalysis revealed yellow turbid fluid, specific   gravity of 1.024, pH of 5, glucose of 100, nitrite positive, moderate   leukocyte esterase, packed white cells, many bacteria.  Culture sent.  I am   told culture from 07/21/2017 revealed Klebsiella pneumoniae, which was   sensitive to piperacillin and tazobactam combination, but intermediate to   nitrofurantoin and piperacillin and resistant to ampicillin.    Chest x-ray reveals bibasilar atelectasis, worse on the left, much less likely   pneumonic abnormality.  No pneumothorax, no effusion, normal cardiac   silhouette.    CT head done because of altered mentation on arrival; cerebral atrophy and   small vessel ischemic changes versus demyelination noted.  There is some   motion artifact with ____ but no obvious intracranial mass or hemorrhage was   seen.    EKG; sinus rhythm at 70, QTc okay at 453, minimal nonspecific  changes.    ASSESSMENT:  1.  Sepsis syndrome without hypotension, probable urinary source.  Eval for   pyelonephritis.  2.  Altered mentation from baseline dementia secondary to sepsis.  Negative CT   head, no meningeal signs.  3.  Lactic acidosis with elevated anion gap secondary to above.  4.  Type 2 diabetes, on therapy with elevated blood sugars.  5.  History of systemic hypertension.  Blood pressure actually elevated.  6.  Probable dehydration, clinically by exam and suspect poor p.o. intake for   the last few days.  7.  History of significant dementia, residing in a group home.  8.  Dyslipidemia historically.  9.  Former smoker without history of chronic obstructive pulmonary disease.  10.  Abnormal chest x-ray, bibasilar atelectasis, suspected over pneumonic   abnormalities.    RECOMMENDATIONS AND CLINICAL DECISION MAKING:  Patient is critically ill, and   her prognosis is guarded.  Code status has been addressed in the emergency   room with patient presenting with daughter, family, and she is a DNR.  The   patient is appropriately started on sepsis protocol and fluid resuscitation   ongoing.  I would not be surprised if she drops her blood pressure tonight.    On the other hand, blood pressure running quite high right after Almaguer change.    I suspect she might have missed some of her medications recently.  Low-dose   infrequent enalapril for p.r.n. use.  Definitely, watch urine output and renal   function closely.  Second bolus may be appropriate given suspected   dehydration.  She is not following well enough or alert enough to eat, so I   think she should be n.p.o. for now and all medicines will be given IV.  Second   peripheral IV being placed by nursing staff.  She does not need a central   line now.  We will monitor closely for need to place a line if she develops   significant hypotension.  Usual prophylaxis ongoing.  As mentioned above,   Almaguer catheter placed.  We will watch her urine output.   Followup lactic acid   levels fortunately trending down.  Empiric IV antibiotics are initiated.    Prior culture revealed sensitivity to her organism, Klebsiella pneumoniae with   the current antibiotics.  Monitor for ESBL and need to switch to Merrem.    The case was discussed at length with nursing staff, respiratory therapy   staff, and Dr. Whitaker, emergency room physician.    Again, the patient is critically ill.  I spent excessive 40 minutes caring for   this patient.  Thank you very much for asking to see her.  We are happy to   assume her care while in the ICU.       ____________________________________     MD NARENDRA TORREZ / LOAGN    DD:  11/07/2017 20:28:14  DT:  11/08/2017 00:39:50    D#:  [id number]  Job#:  [id number]

## 2017-11-08 NOTE — CARE PLAN
Problem: Communication  Goal: The ability to communicate needs accurately and effectively will improve    Intervention: Educate patient and significant other/support system about the plan of care, procedures, treatments, medications and allow for questions  Family updated on POC.  All questions answered.       Problem: Safety  Goal: Will remain free from falls    Intervention: Implement fall precautions  Patient educated on the importance of calling RN before getting out of bed. Treaded socks on patient, bed in lowest position, bed alarm activated and call light within reach.

## 2017-11-09 ENCOUNTER — TELEPHONE (OUTPATIENT)
Dept: MEDICAL GROUP | Facility: PHYSICIAN GROUP | Age: 77
End: 2017-11-09

## 2017-11-09 LAB
BACTERIA UR CULT: ABNORMAL
BACTERIA UR CULT: ABNORMAL
SIGNIFICANT IND 70042: ABNORMAL
SITE SITE: ABNORMAL
SOURCE SOURCE: ABNORMAL

## 2017-11-09 PROCEDURE — 700111 HCHG RX REV CODE 636 W/ 250 OVERRIDE (IP): Performed by: HOSPITALIST

## 2017-11-09 PROCEDURE — 700105 HCHG RX REV CODE 258

## 2017-11-09 PROCEDURE — A9270 NON-COVERED ITEM OR SERVICE: HCPCS

## 2017-11-09 PROCEDURE — A9270 NON-COVERED ITEM OR SERVICE: HCPCS | Performed by: INTERNAL MEDICINE

## 2017-11-09 PROCEDURE — 770006 HCHG ROOM/CARE - MED/SURG/GYN SEMI*

## 2017-11-09 PROCEDURE — 700102 HCHG RX REV CODE 250 W/ 637 OVERRIDE(OP)

## 2017-11-09 PROCEDURE — 700102 HCHG RX REV CODE 250 W/ 637 OVERRIDE(OP): Performed by: PHARMACIST

## 2017-11-09 PROCEDURE — 700102 HCHG RX REV CODE 250 W/ 637 OVERRIDE(OP): Performed by: INTERNAL MEDICINE

## 2017-11-09 PROCEDURE — 700101 HCHG RX REV CODE 250: Performed by: HOSPITALIST

## 2017-11-09 PROCEDURE — 700111 HCHG RX REV CODE 636 W/ 250 OVERRIDE (IP)

## 2017-11-09 PROCEDURE — 700102 HCHG RX REV CODE 250 W/ 637 OVERRIDE(OP): Performed by: HOSPITALIST

## 2017-11-09 PROCEDURE — A9270 NON-COVERED ITEM OR SERVICE: HCPCS | Performed by: PHARMACIST

## 2017-11-09 PROCEDURE — A9270 NON-COVERED ITEM OR SERVICE: HCPCS | Performed by: HOSPITALIST

## 2017-11-09 RX ORDER — OMEPRAZOLE 20 MG/1
20 CAPSULE, DELAYED RELEASE ORAL DAILY
Status: DISCONTINUED | OUTPATIENT
Start: 2017-11-09 | End: 2017-11-13 | Stop reason: HOSPADM

## 2017-11-09 RX ORDER — POLYETHYLENE GLYCOL 3350 17 G/17G
1 POWDER, FOR SOLUTION ORAL DAILY
Status: DISCONTINUED | OUTPATIENT
Start: 2017-11-09 | End: 2017-11-13 | Stop reason: HOSPADM

## 2017-11-09 RX ORDER — LISINOPRIL 20 MG/1
20 TABLET ORAL
Status: DISCONTINUED | OUTPATIENT
Start: 2017-11-09 | End: 2017-11-12

## 2017-11-09 RX ORDER — CHOLECALCIFEROL (VITAMIN D3) 125 MCG
1000 CAPSULE ORAL DAILY
Status: DISCONTINUED | OUTPATIENT
Start: 2017-11-09 | End: 2017-11-13 | Stop reason: HOSPADM

## 2017-11-09 RX ORDER — POTASSIUM CHLORIDE 20 MEQ/1
20 TABLET, EXTENDED RELEASE ORAL DAILY
Status: DISCONTINUED | OUTPATIENT
Start: 2017-11-09 | End: 2017-11-13 | Stop reason: HOSPADM

## 2017-11-09 RX ORDER — HYDROCHLOROTHIAZIDE 25 MG/1
25 TABLET ORAL
Status: DISCONTINUED | OUTPATIENT
Start: 2017-11-09 | End: 2017-11-13 | Stop reason: HOSPADM

## 2017-11-09 RX ORDER — LISINOPRIL AND HYDROCHLOROTHIAZIDE 25; 20 MG/1; MG/1
1 TABLET ORAL DAILY
Status: DISCONTINUED | OUTPATIENT
Start: 2017-11-09 | End: 2017-11-09

## 2017-11-09 RX ADMIN — POTASSIUM CHLORIDE 20 MEQ: 1500 TABLET, EXTENDED RELEASE ORAL at 10:33

## 2017-11-09 RX ADMIN — ATORVASTATIN CALCIUM 20 MG: 20 TABLET, FILM COATED ORAL at 21:16

## 2017-11-09 RX ADMIN — DONEPEZIL HYDROCHLORIDE 20 MG: 5 TABLET, FILM COATED ORAL at 21:16

## 2017-11-09 RX ADMIN — POTASSIUM CHLORIDE AND SODIUM CHLORIDE: 900; 150 INJECTION, SOLUTION INTRAVENOUS at 09:30

## 2017-11-09 RX ADMIN — POTASSIUM CHLORIDE AND SODIUM CHLORIDE: 900; 150 INJECTION, SOLUTION INTRAVENOUS at 19:09

## 2017-11-09 RX ADMIN — ASPIRIN 81 MG: 81 TABLET, COATED ORAL at 08:32

## 2017-11-09 RX ADMIN — QUETIAPINE FUMARATE 50 MG: 25 TABLET ORAL at 21:16

## 2017-11-09 RX ADMIN — CYANOCOBALAMIN TAB 500 MCG 1000 MCG: 500 TAB at 10:33

## 2017-11-09 RX ADMIN — TAZOBACTAM SODIUM AND PIPERACILLIN SODIUM 3.38 G: 375; 3 INJECTION, SOLUTION INTRAVENOUS at 05:29

## 2017-11-09 RX ADMIN — PIPERACILLIN SODIUM AND TAZOBACTAM SODIUM 3.38 G: 3; .375 INJECTION, POWDER, FOR SOLUTION INTRAVENOUS at 19:07

## 2017-11-09 RX ADMIN — MEMANTINE HYDROCHLORIDE 5 MG: 10 TABLET ORAL at 21:19

## 2017-11-09 RX ADMIN — POLYETHYLENE GLYCOL 3350 1 PACKET: 17 POWDER, FOR SOLUTION ORAL at 10:33

## 2017-11-09 RX ADMIN — MEMANTINE HYDROCHLORIDE 5 MG: 10 TABLET ORAL at 08:32

## 2017-11-09 RX ADMIN — OMEPRAZOLE 20 MG: 20 CAPSULE, DELAYED RELEASE ORAL at 10:33

## 2017-11-09 RX ADMIN — LISINOPRIL 20 MG: 20 TABLET ORAL at 10:33

## 2017-11-09 RX ADMIN — STANDARDIZED SENNA CONCENTRATE AND DOCUSATE SODIUM 2 TABLET: 8.6; 5 TABLET, FILM COATED ORAL at 08:31

## 2017-11-09 RX ADMIN — ENOXAPARIN SODIUM 40 MG: 100 INJECTION SUBCUTANEOUS at 08:31

## 2017-11-09 RX ADMIN — PIPERACILLIN SODIUM AND TAZOBACTAM SODIUM 3.38 G: 3; .375 INJECTION, POWDER, FOR SOLUTION INTRAVENOUS at 12:39

## 2017-11-09 ASSESSMENT — PAIN SCALES - GENERAL
PAINLEVEL_OUTOF10: 0

## 2017-11-09 NOTE — TELEPHONE ENCOUNTER
Spoke with patient's daughter  (I had met patient with her daughter-in-law Lilia last week in the office) who reports that Nena is doing a lot better since being on antibiotics for the UTI. I asked her to let me know if I can be of any further help in her hospital care and to ensure that the team schedules in office follow up after discharge whenever that may be with me.     Vasquez Mary M.D.

## 2017-11-09 NOTE — PROGRESS NOTES
Pulmonary Critical Care Progress Note        DOS: 11/9/17    History of Present Illness:  The patient is a 77-year-old woman, who presented   to the emergency room today for altered LOC.  She resides at a local nursing   facility and suffers from significant dementia.  The group home staff   apparently found her somnolent, poorly arousable.  She subsequently began   having vomiting, and emergency medical services were contacted.  I did not   speak with family directly but spoke with emergency room physician, and on   arrival to the ER, she was fatigued, but her LOC was near normal per family.    There apparently have been similar episodes in the past.  Recent evaluation by   primary care physician prompted adjustment of Ativan for anxiety and mood   management.  The Ativan dose apparently has been doubled.  There is no clear   documented history of fever, chills, sweats, diarrhea, bleeding, URI, or other   focal respiratory symptoms.  The patient had a normal white count, but a   low-grade temperature of 100.9.  Urinalysis was extremely abnormal consistent   with probable UTI.  Patient may have had some CVAT.  The lactic acid actually   was measured from a sepsis evaluation standpoint.  Her initial lactic acid was   actually 4.4.  Fluid resuscitation initiated a repeat lactic, came down to   2.6.  She does not have significant hypotension.  She was seen by Dr. Whitaker   for admission purposes and subsequently transferred from the ER to the ICU for  further care.  On arrival to the ICU, I saw her and she was pleasant, confused, hypertensive,  and not a very good historian.  Most of the history is taken from ER staff,   Dr. Whitaker, the records, and the EHR.    ROS:    Respiratory: unable to perform due to the patient's inability to effectively communicate,   Cardiac: unable to perform due to the patient's inability to effectively communicate,   GI: unable to perform due to the patient's inability to effectively  communicate.      Interval Events:  24 hour interval history reviewed   Reason for visit:  Altered LOC, urosepsis    LOC improved again  Pleasantly confused and perhaps back to baseline dementia  Does admit to some dysuria a few days ago but has no other UTI symptoms  Mobilizing with assistance  Hemodynamics okay  Afebrile  Escherichia coli, 10^5 colonies, pansensitive growing in urine  No chest x-ray  Remains on room air       Yesterday  Afebrile  Alert and confused - oriented to self - does follow  Hemodynamics okay  Almaguer - UO ok  Mobilizing  Lactic normal  Bp better - one PRN Bp med overnight  I/Os +2.2 liters/admit  Room air  Urine C/s pending - lactose posiive GNR, BC neg so far  CXR 11/7 BB atelectasis    PFSH:  No change.    Respiratory:     Pulse Oximetry: 96 %  Room air  WOB ok          Exam: unlabored respirations, no intercostal retractions or accessory muscle use and clear to auscultation without rales or wheezes - no change  ImagingCXR  I have personally reviewed the chest x-ray my impression is  noted above and films are reviewed with Team on rounds           Invalid input(s): [id number]    HemoDynamics:  Pulse: 79, Heart Rate (Monitored): 90  NIBP: 159/108       Exam: regular rhythm (Sinus), no ectopy  Imaging: Available data reviewed  Recent Labs      11/07/17   1348   TROPONINI  <0.01       Neuro:  GCS Total [place] Coma Score: 14       Exam: no focal deficits noted Confused - better  Imaging: Ct Brain Reviewed   11/7  1.  Cerebral atrophy.  2.  White matter lucencies most consistent with small vessel ischemic change versus demyelination or gliosis.  3.  Considerable motion and misregistration artifact on the images. No intracranial mass effect or acute hemorrhage identified.      Fluids:  Intake/Output       11/07/17 0700 - 11/08/17 0659 11/08/17 0700 - 11/09/17 0659 11/09/17 0700 - 11/10/17 0659      7480-8702 4252-1264 Total 3272-4375 4204-4671 Total 8742-2281 6354-1160 Total       Intake     P.O.  --  -- --  --  120 120  --  -- --    P.O. -- -- -- -- 120 120 -- -- --    I.V.  1300  2500 3800  --  1350 1350  --  -- --    IV Piggyback Volume (IV Piggyback) 100 200 300 -- 100 100 -- -- --    IV Volume (NS with 20 KCl) 200 1500 1700 -- 1250 1250 -- -- --    IV Volume (NS BOLUS) 4812 926 9405 -- -- -- -- -- --    Total Intake 1300 2500 3800 -- 1470 1470 -- -- --       Output    Urine  300  1300 1600  1375  1300 2675  --  -- --    Indwelling Cathether 300 1300 1600 1375 1300 2675 -- -- --    Stool  --  -- --  --  -- --  --  -- --    Number of Times Stooled -- -- -- 1 x -- 1 x -- -- --    Total Output 300 1300 1600 1375 1300 2675 -- -- --       Net I/O     1000 1200 2200 -1375 170 -1205 -- -- --        Weight: 64.8 kg (142 lb 13.7 oz)  Recent Labs      17   1348  17   0220   SODIUM  137  139   POTASSIUM  3.9  3.8   CHLORIDE  106  111   CO2  19*  20   BUN  16  11   CREATININE  0.94  0.77   CALCIUM  9.2  7.9*       GI/Nutrition:  Exam: abdomen is soft and non-tender, normal active bowel sounds, no CVAT  Imaging: Available data reviewed  taking PO well  Liver Function  Recent Labs      17   1348  17   0220   ALTSGPT  11   --    ASTSGOT  12   --    ALKPHOSPHAT  85   --    TBILIRUBIN  0.4   --    LIPASE  68   --    GLUCOSE  178*  136*       Heme:  Recent Labs      17   1348  17   0625   RBC  4.47  3.54*   HEMOGLOBIN  13.0  10.2*   HEMATOCRIT  39.9  31.3*   PLATELETCT  240  188       Infectious Disease:  Monitored Temp  Av.9 °C (98.5 °F)  Min: 36.9 °C (98.4 °F)  Max: 37.1 °C (98.8 °F)  Temp  Av.8 °C (98.3 °F)  Min: 36.1 °C (97 °F)  Max: 37.5 °C (99.5 °F)  Micro: antibiotics reviewed and cultures reviewed   Urine Escherichia coli  Recent Labs      17   1348  17   0625   WBC  7.8  6.2   NEUTSPOLYS  84.40*  64.00   LYMPHOCYTES  9.60*  23.60   MONOCYTES  4.70  9.40   EOSINOPHILS  0.50  2.00   BASOPHILS  0.40  0.70   ASTSGOT  12   --    ALTSGPT  11   --     ALKPHOSPHAT  85   --    TBILIRUBIN  0.4   --      Current Facility-Administered Medications   Medication Dose Frequency Provider Last Rate Last Dose   • aspirin EC (ECOTRIN) tablet 81 mg  81 mg DAILY Carlos Whitaker M.D.   81 mg at 11/08/17 0918   • atorvastatin (LIPITOR) tablet 20 mg  20 mg Q EVENING Carlos Whitaker M.D.   20 mg at 11/08/17 2022   • quetiapine (SEROQUEL) tablet 50 mg  50 mg QHS Carlos Whitaker M.D.   50 mg at 11/08/17 2022   • senna-docusate (PERICOLACE or SENOKOT S) 8.6-50 MG per tablet 2 Tab  2 Tab BID Carlos Whitaker M.D.   Stopped at 11/08/17 2031    And   • polyethylene glycol/lytes (MIRALAX) PACKET 1 Packet  1 Packet QDAY PRN Carlos Whitaker M.D.        And   • magnesium hydroxide (MILK OF MAGNESIA) suspension 30 mL  30 mL QDAY PRN Carlos Whitaker M.D.        And   • bisacodyl (DULCOLAX) suppository 10 mg  10 mg QDAY PRN Carlos Whitaker M.D.       • Respiratory Care per Protocol   Continuous RT Carlso Whitaker M.D.       • 0.9 % NaCl with KCl 20 mEq infusion   Continuous Carlos Whitaker M.D. 125 mL/hr at 11/08/17 2301     • NS (BOLUS) infusion 1,000 mL  1,000 mL Once PRN Carlos Whitaker M.D.       • enoxaparin (LOVENOX) inj 40 mg  40 mg DAILY Carlos Whitaker M.D.   40 mg at 11/08/17 0918   • piperacillin-tazobactam (ZOSYN) IVPB premix 3.375 g  3.375 g Q6HRS Carlos Whitaker M.D.   Stopped at 11/08/17 2342   • donepezil (ARICEPT) tablet 20 mg  20 mg Q EVENING Carlos Whitaker M.D.   20 mg at 11/08/17 2022   • memantine (NAMENDA) tablet 5 mg  5 mg BID Lizett Mahoney PharmD   5 mg at 11/08/17 2022   • enalaprilat (VASOTEC) injection 0.625 mg  0.625 mg Q8HRS PRN Vikas Villa M.D.   0.625 mg at 11/07/17 1728     Last reviewed on 11/7/2017  3:18 PM by Jia Logan    Quality  Measures:   Labs reviewed, Radiology images reviewed, Medications reviewed and EKG reviewed                 Assessed for rehab: Patient was assess for and/or received rehabilitation services during this  hospitalization      Problems/plan:    Sepsis syndrome without hypotension, probable urinary source.  Eval for pyelonephritis.   IV Zosyn   De-escalate to ceftriaxone, may be able to switch to oral antibiotics soon   Monitor for need for U/s  Urinary tract infection with Escherichia coli that is pansensitive  Altered mentation from baseline dementia secondary to sepsis.   improved   Negative CT head, no meningeal signs.   LOC better today - baseline  Lactic acidosis with elevated anion gap secondary to above.   Resolved  Type 2 diabetes, on therapy with elevated blood sugars.   Oral agent - SSI - diet  History of systemic hypertension.  Blood pressure actually elevated. Improved   Resume home meds   PRN enalapril  Probable dehydration, clinically by exam and suspect poor p.o. intake for the last few days.   Better with fluids   Resolved  History of significant dementia, residing in a group home.  Dyslipidemia historically - diet - statin  Former smoker without history of chronic obstructive pulmonary disease.   RT protocols PRN  Abnormal chest x-ray, bibasilar atelectasis, suspected over pneumonic abnormalities   IS, mobilize  DNR  Transfer to medical - orders in    Discussed patient condition and risk of morbidity and/or mortality with RN, RT, Pharmacy, Dietary,  and Charge nurse / hot rounds.

## 2017-11-09 NOTE — PROGRESS NOTES
Two RN skin assessment performed with MAINE Desai upon patient's admission. Skin found to be intact, pink, dry, no rash or other abnormalities noted. BUE present bruises d/t IV access and skin sensitivity. Patient denies any itching. Bony areas assessed, negative for redness.

## 2017-11-09 NOTE — PROGRESS NOTES
Received report from ICU nurse. Received pt. Via wheelchair thru hospital transport. Pt. A&OX1, pleasantly confused. Initial VS taken and documented. Administered medications as ordered.

## 2017-11-09 NOTE — CARE PLAN
Problem: Safety  Goal: Will remain free from injury  Outcome: PROGRESSING AS EXPECTED  Bed alarm in use, distraction techniques used to keep pt from pulling at medical devices.  Fall socks in place.    Problem: Infection  Goal: Will remain free from infection  Outcome: PROGRESSING AS EXPECTED  Fluids administered as ordered, antibiotics administered as ordered--zosyn currently.   Pt afebrile.  Almaguer remains in place.  Will continue to monitor.

## 2017-11-10 LAB
ALBUMIN SERPL BCP-MCNC: 3.2 G/DL (ref 3.2–4.9)
BASOPHILS # BLD AUTO: 0.6 % (ref 0–1.8)
BASOPHILS # BLD: 0.03 K/UL (ref 0–0.12)
BUN SERPL-MCNC: 7 MG/DL (ref 8–22)
CALCIUM SERPL-MCNC: 8.6 MG/DL (ref 8.5–10.5)
CHLORIDE SERPL-SCNC: 109 MMOL/L (ref 96–112)
CO2 SERPL-SCNC: 22 MMOL/L (ref 20–33)
CREAT SERPL-MCNC: 0.72 MG/DL (ref 0.5–1.4)
EOSINOPHIL # BLD AUTO: 0.26 K/UL (ref 0–0.51)
EOSINOPHIL NFR BLD: 4.9 % (ref 0–6.9)
ERYTHROCYTE [DISTWIDTH] IN BLOOD BY AUTOMATED COUNT: 42.2 FL (ref 35.9–50)
GFR SERPL CREATININE-BSD FRML MDRD: >60 ML/MIN/1.73 M 2
GLUCOSE SERPL-MCNC: 106 MG/DL (ref 65–99)
HCT VFR BLD AUTO: 37.3 % (ref 37–47)
HGB BLD-MCNC: 12.4 G/DL (ref 12–16)
IMM GRANULOCYTES # BLD AUTO: 0.02 K/UL (ref 0–0.11)
IMM GRANULOCYTES NFR BLD AUTO: 0.4 % (ref 0–0.9)
LYMPHOCYTES # BLD AUTO: 1.31 K/UL (ref 1–4.8)
LYMPHOCYTES NFR BLD: 24.9 % (ref 22–41)
MCH RBC QN AUTO: 28.8 PG (ref 27–33)
MCHC RBC AUTO-ENTMCNC: 33.2 G/DL (ref 33.6–35)
MCV RBC AUTO: 86.5 FL (ref 81.4–97.8)
MONOCYTES # BLD AUTO: 0.5 K/UL (ref 0–0.85)
MONOCYTES NFR BLD AUTO: 9.5 % (ref 0–13.4)
NEUTROPHILS # BLD AUTO: 3.15 K/UL (ref 2–7.15)
NEUTROPHILS NFR BLD: 59.7 % (ref 44–72)
NRBC # BLD AUTO: 0 K/UL
NRBC BLD AUTO-RTO: 0 /100 WBC
PHOSPHATE SERPL-MCNC: 3.3 MG/DL (ref 2.5–4.5)
PLATELET # BLD AUTO: 255 K/UL (ref 164–446)
PMV BLD AUTO: 9.8 FL (ref 9–12.9)
POTASSIUM SERPL-SCNC: 4 MMOL/L (ref 3.6–5.5)
RBC # BLD AUTO: 4.31 M/UL (ref 4.2–5.4)
SODIUM SERPL-SCNC: 138 MMOL/L (ref 135–145)
WBC # BLD AUTO: 5.3 K/UL (ref 4.8–10.8)

## 2017-11-10 PROCEDURE — A9270 NON-COVERED ITEM OR SERVICE: HCPCS | Performed by: INTERNAL MEDICINE

## 2017-11-10 PROCEDURE — 97161 PT EVAL LOW COMPLEX 20 MIN: CPT

## 2017-11-10 PROCEDURE — G8979 MOBILITY GOAL STATUS: HCPCS | Mod: CI

## 2017-11-10 PROCEDURE — G8987 SELF CARE CURRENT STATUS: HCPCS | Mod: CK

## 2017-11-10 PROCEDURE — G8989 SELF CARE D/C STATUS: HCPCS | Mod: CK

## 2017-11-10 PROCEDURE — G8980 MOBILITY D/C STATUS: HCPCS | Mod: CI

## 2017-11-10 PROCEDURE — 770006 HCHG ROOM/CARE - MED/SURG/GYN SEMI*

## 2017-11-10 PROCEDURE — A9270 NON-COVERED ITEM OR SERVICE: HCPCS | Performed by: HOSPITALIST

## 2017-11-10 PROCEDURE — 700111 HCHG RX REV CODE 636 W/ 250 OVERRIDE (IP): Performed by: HOSPITALIST

## 2017-11-10 PROCEDURE — G8988 SELF CARE GOAL STATUS: HCPCS | Mod: CK

## 2017-11-10 PROCEDURE — A9270 NON-COVERED ITEM OR SERVICE: HCPCS

## 2017-11-10 PROCEDURE — 700102 HCHG RX REV CODE 250 W/ 637 OVERRIDE(OP): Performed by: INTERNAL MEDICINE

## 2017-11-10 PROCEDURE — 700102 HCHG RX REV CODE 250 W/ 637 OVERRIDE(OP): Performed by: PHARMACIST

## 2017-11-10 PROCEDURE — 97165 OT EVAL LOW COMPLEX 30 MIN: CPT

## 2017-11-10 PROCEDURE — 99232 SBSQ HOSP IP/OBS MODERATE 35: CPT | Performed by: INTERNAL MEDICINE

## 2017-11-10 PROCEDURE — 700102 HCHG RX REV CODE 250 W/ 637 OVERRIDE(OP): Performed by: HOSPITALIST

## 2017-11-10 PROCEDURE — 36415 COLL VENOUS BLD VENIPUNCTURE: CPT

## 2017-11-10 PROCEDURE — 700111 HCHG RX REV CODE 636 W/ 250 OVERRIDE (IP): Performed by: INTERNAL MEDICINE

## 2017-11-10 PROCEDURE — 700102 HCHG RX REV CODE 250 W/ 637 OVERRIDE(OP)

## 2017-11-10 PROCEDURE — 85025 COMPLETE CBC W/AUTO DIFF WBC: CPT

## 2017-11-10 PROCEDURE — 80069 RENAL FUNCTION PANEL: CPT

## 2017-11-10 PROCEDURE — A9270 NON-COVERED ITEM OR SERVICE: HCPCS | Performed by: PHARMACIST

## 2017-11-10 PROCEDURE — G8978 MOBILITY CURRENT STATUS: HCPCS | Mod: CI

## 2017-11-10 RX ADMIN — POTASSIUM CHLORIDE 20 MEQ: 1500 TABLET, EXTENDED RELEASE ORAL at 09:19

## 2017-11-10 RX ADMIN — STANDARDIZED SENNA CONCENTRATE AND DOCUSATE SODIUM 2 TABLET: 8.6; 5 TABLET, FILM COATED ORAL at 20:00

## 2017-11-10 RX ADMIN — LISINOPRIL 20 MG: 20 TABLET ORAL at 09:21

## 2017-11-10 RX ADMIN — ENOXAPARIN SODIUM 40 MG: 100 INJECTION SUBCUTANEOUS at 09:19

## 2017-11-10 RX ADMIN — DONEPEZIL HYDROCHLORIDE 20 MG: 5 TABLET, FILM COATED ORAL at 19:59

## 2017-11-10 RX ADMIN — MEMANTINE HYDROCHLORIDE 5 MG: 10 TABLET ORAL at 09:20

## 2017-11-10 RX ADMIN — CEFTRIAXONE SODIUM 1000 MG: 10 INJECTION, POWDER, FOR SOLUTION INTRAVENOUS at 09:22

## 2017-11-10 RX ADMIN — QUETIAPINE FUMARATE 50 MG: 25 TABLET ORAL at 20:00

## 2017-11-10 RX ADMIN — ATORVASTATIN CALCIUM 20 MG: 20 TABLET, FILM COATED ORAL at 20:00

## 2017-11-10 RX ADMIN — ASPIRIN 81 MG: 81 TABLET, COATED ORAL at 09:19

## 2017-11-10 RX ADMIN — CYANOCOBALAMIN TAB 500 MCG 1000 MCG: 500 TAB at 09:19

## 2017-11-10 RX ADMIN — MEMANTINE HYDROCHLORIDE 5 MG: 10 TABLET ORAL at 20:00

## 2017-11-10 RX ADMIN — HYDROCHLOROTHIAZIDE 25 MG: 25 TABLET ORAL at 09:19

## 2017-11-10 RX ADMIN — OMEPRAZOLE 20 MG: 20 CAPSULE, DELAYED RELEASE ORAL at 09:19

## 2017-11-10 ASSESSMENT — COGNITIVE AND FUNCTIONAL STATUS - GENERAL
TURNING FROM BACK TO SIDE WHILE IN FLAT BAD: A LITTLE
STANDING UP FROM CHAIR USING ARMS: A LITTLE
HELP NEEDED FOR BATHING: A LOT
SUGGESTED CMS G CODE MODIFIER DAILY ACTIVITY: CK
DRESSING REGULAR LOWER BODY CLOTHING: A LOT
EATING MEALS: A LITTLE
MOVING FROM LYING ON BACK TO SITTING ON SIDE OF FLAT BED: A LITTLE
WALKING IN HOSPITAL ROOM: A LITTLE
MOBILITY SCORE: 18
SUGGESTED CMS G CODE MODIFIER MOBILITY: CK
TOILETING: A LOT
CLIMB 3 TO 5 STEPS WITH RAILING: A LITTLE
DAILY ACTIVITIY SCORE: 14
MOVING TO AND FROM BED TO CHAIR: A LITTLE
DRESSING REGULAR UPPER BODY CLOTHING: A LOT
PERSONAL GROOMING: A LITTLE

## 2017-11-10 ASSESSMENT — ACTIVITIES OF DAILY LIVING (ADL): TOILETING: REQUIRES ASSIST

## 2017-11-10 ASSESSMENT — PAIN SCALES - GENERAL
PAINLEVEL_OUTOF10: 0
PAINLEVEL_OUTOF10: 0

## 2017-11-10 ASSESSMENT — GAIT ASSESSMENTS
GAIT LEVEL OF ASSIST: STAND BY ASSIST
DISTANCE (FEET): 50
DEVIATION: SHUFFLED GAIT

## 2017-11-10 NOTE — THERAPY
"Physical Therapy Evaluation completed.   Bed Mobility:  Supine to Sit: Stand by Assist  Transfers: Sit to Stand: Stand by Assist  Gait: Level Of Assist: Stand by Assist with No Equipment Needed       Plan of Care: Patient with no further skilled PT needs in the acute care setting at this time  Discharge Recommendations: Equipment: No Equipment Needed. Post-acute therapy Discharge to home with outpatient or home health for additional skilled therapy services.    See \"Rehab Therapy-Acute\" Patient Summary Report for complete documentation.     Patient is at baseline for mobility.  Patient can return to group home.  "

## 2017-11-10 NOTE — THERAPY
"Occupational Therapy Evaluation completed.   Functional Status:  Pt presented to skilled OT services following UTI and septic shock. Pt requires mod/max a with ADLs due to significant cognitive deficts, pt unable to follow through with task progression with [place] assist, verbal and tactile cues with one step directions. Per chart review, it appears pt has assitance with ADLs and IADLs from staff at group home and this is pt's baseline given hx of dementia and observation. Pt physically able to do it but limited by cognitive deficits, continue 24/7 care at group home. Pt would unlikely to benefit from skilled acute OT services at this time.   Plan of Care: Patient with no further skilled OT needs in the acute care setting at this time  Discharge Recommendations:  Equipment: No Equipment Needed. Post-acute therapy Discharge to home with outpatient or home health for additional skilled therapy services. and Currently anticipate no further skilled therapy needs once patient is discharged from the inpatient setting.    See \"Rehab Therapy-Acute\" Patient Summary Report for complete documentation.    "

## 2017-11-10 NOTE — CARE PLAN
Problem: Safety  Goal: Will remain free from falls  Outcome: PROGRESSING AS EXPECTED  Pt. Demented, A&OX1. Bed alarm ON, bed kept low and locked, call light within reach, assisted as necessary.    Problem: Bowel/Gastric:  Goal: Normal bowel function is maintained or improved  Outcome: PROGRESSING AS EXPECTED  Pt. Incontinent, established bowel routine with pt. Offered bed pan and commode every 2 hrs.

## 2017-11-10 NOTE — PROGRESS NOTES
Assumed care of pt. This morning, asleep but wakes up on verbal command. Pt. Is A&Ox1, denies any pain/discomfort at the time of assx. Pt. Has intact PIV on saline lock. Fall prec in place. Bed alarm is on. Call light within reach. Needs attended.

## 2017-11-10 NOTE — PROGRESS NOTES
Hospital Medicine Interval Note  Date of Service:  11/10/2017    Chief Complaint  77 y.o.-year-old female with h/o dementia, HTN, and HLD admitted 11/7/2017 with ALOC and N/V secondary to UTI/sepsis.    Interval Problem Update  Mentation likely at baseline.  Clinically appears comfortable.  VSS.  Afebrile.  Pending PT/OT.    Consultants/Specialty  Pulmonary - S/O    Disposition  Group home vs SNF at d/c pending PT/OT recs.  SW following.     Review of Systems   Unable to perform ROS: Mental acuity      Physical Exam Laboratory/Imaging   Vitals:    11/09/17 1510 11/09/17 1905 11/10/17 0410 11/10/17 0703   BP: 131/82 133/97 139/95 135/62   Pulse: 64 60 65 87   Resp: 16 18 18 16   Temp: 36.7 °C (98 °F) 36.6 °C (97.9 °F) 36.4 °C (97.5 °F) 36.4 °C (97.5 °F)   SpO2: 93% 98% 100% 96%   Weight:       Height:         Physical Exam   Constitutional: No distress.   HENT:   Head: Normocephalic and atraumatic.   Mouth/Throat: No oropharyngeal exudate.   Eyes: Conjunctivae are normal. Right eye exhibits no discharge. Left eye exhibits no discharge.   Neck: Neck supple. No tracheal deviation present.   Cardiovascular: Normal rate, regular rhythm, normal heart sounds and intact distal pulses.    No murmur heard.  Pulmonary/Chest: Effort normal and breath sounds normal. No stridor. No respiratory distress. She has no wheezes.   Abdominal: Soft. Bowel sounds are normal. She exhibits no distension. There is no tenderness.   Musculoskeletal: She exhibits no edema or deformity.   Neurological: She is alert. No cranial nerve deficit.   Oriented x 1.   Skin: Skin is warm and dry. No rash noted. She is not diaphoretic. No erythema.    Lab Results   Component Value Date/Time    WBC 5.3 11/10/2017 08:23 AM    HEMOGLOBIN 12.4 11/10/2017 08:23 AM    HEMATOCRIT 37.3 11/10/2017 08:23 AM    PLATELETCT 255 11/10/2017 08:23 AM     Lab Results   Component Value Date/Time    SODIUM 138 11/10/2017 08:23 AM    POTASSIUM 4.0 11/10/2017 08:23 AM     CHLORIDE 109 11/10/2017 08:23 AM    CO2 22 11/10/2017 08:23 AM    GLUCOSE 106 (H) 11/10/2017 08:23 AM    BUN 7 (L) 11/10/2017 08:23 AM    CREATININE 0.72 11/10/2017 08:23 AM      Assessment/Plan    * UTI (urinary tract infection)- (present on admission)   Assessment & Plan    - Urine cx positive for E. Coli.  - Continue ceftriaxone.   - Afebrile.  - Mentation improving.        Septic shock (CMS-HCC)- (present on admission)   Assessment & Plan    - Resolved.  - Secondary to UTI.  - Lactic acid 1.5  - Afebrile.  - Continue antibx.        Dementia with behavioral disturbance- (present on admission)   Assessment & Plan    - Mentation likely at baseline.  - Continue seroquel, namenda, and aricept.         Hyperlipidemia- (present on admission)   Assessment & Plan    - Continue lipitor.        Hypertension- (present on admission)   Assessment & Plan    - Stable.  - Continue lisinopril and hctz.             Reviewed items::  Radiology images reviewed, Labs reviewed and Medications reviewed  Almaguer catheter::  No Almaguer  DVT prophylaxis pharmacological::  Enoxaparin (Lovenox)  Ulcer Prophylaxis::  Not indicated  Antibiotics:  Treating active infection/contamination beyond 24 hours perioperative coverage

## 2017-11-10 NOTE — DISCHARGE PLANNING
Care Transition Team Assessment    IHD spoke with pt's daughter over the phone due to pt disorientation. She currently is a resident of [facility]. She does not have DME, home O2, or HHC at this time. Per daughter, pt's family will be able to provide transportation upon discharge if needed.     Information Source  Orientation : Disoriented to Event, Disoriented to Place, Disoriented to Time  Information Given By: Relative (Adult daughter)  Informant's Name: Yarely Wu         Elopement Risk  Legal Hold: No  Ambulatory or Self Mobile in Wheelchair: No-Not an Elopement Risk  Elopement Risk: Not at Risk for Elopement    Interdisciplinary Discharge Planning  Lives with - Patient's Self Care Capacity: Attendant / Paid Care Giver  Support Systems: Children  Housing / Facility: senior living  Do You Take your Prescribed Medications Regularly: Yes  Able to Return to Previous ADL's: No  Mobility Issues: No    Discharge Preparedness  What is your plan after discharge?: Uncertain - pending medical team collaboration  What are your discharge supports?: Other (comment), Child (group [place] )  Prior Functional Level: Ambulatory, Needs Assist with Activities of Daily Living, Needs Assist with Medication Management  Difficulity with ADLs: Bathing, Dressing  Difficulty with ADLs Comment: Assisted by group home staff  Difficulity with IADLs: Cooking, Driving, Keeping track of finances, Laundry, Managing medication, Shopping, Using the telephone or computer  Difficulity with IADL Comments: assisted by [facility] and family    Functional Assesment  Prior Functional Level: Ambulatory, Needs Assist with Activities of Daily Living, Needs Assist with Medication Management    Finances  Financial Barriers to Discharge: No  Prescription Coverage: Yes ([employer])    Vision / Hearing Impairment  Vision Impairment : Yes  Right Eye Vision: Impaired, Wears Glasses  Left Eye Vision: Impaired, Wears Glasses                    Psychological Assessment  History of Substance Abuse: None  History of Psychiatric Problems: No  Non-compliant with Treatment: No    Discharge Risks or Barriers  Discharge risks or barriers?: No    Anticipated Discharge Information  Anticipated discharge disposition: Discharge needs currently unknown  Discharge Address: unknown at this time  Discharge Contact Phone Number: [phone]

## 2017-11-11 LAB
ALBUMIN SERPL BCP-MCNC: 3.5 G/DL (ref 3.2–4.9)
BASOPHILS # BLD AUTO: 0.6 % (ref 0–1.8)
BASOPHILS # BLD: 0.08 K/UL (ref 0–0.12)
BUN SERPL-MCNC: 10 MG/DL (ref 8–22)
CALCIUM SERPL-MCNC: 9 MG/DL (ref 8.5–10.5)
CHLORIDE SERPL-SCNC: 105 MMOL/L (ref 96–112)
CO2 SERPL-SCNC: 17 MMOL/L (ref 20–33)
CREAT SERPL-MCNC: 1.07 MG/DL (ref 0.5–1.4)
EOSINOPHIL # BLD AUTO: 0.12 K/UL (ref 0–0.51)
EOSINOPHIL NFR BLD: 0.9 % (ref 0–6.9)
ERYTHROCYTE [DISTWIDTH] IN BLOOD BY AUTOMATED COUNT: 44.3 FL (ref 35.9–50)
GFR SERPL CREATININE-BSD FRML MDRD: 49 ML/MIN/1.73 M 2
GLUCOSE SERPL-MCNC: 140 MG/DL (ref 65–99)
HCT VFR BLD AUTO: 41.5 % (ref 37–47)
HGB BLD-MCNC: 13.2 G/DL (ref 12–16)
IMM GRANULOCYTES # BLD AUTO: 0.08 K/UL (ref 0–0.11)
IMM GRANULOCYTES NFR BLD AUTO: 0.6 % (ref 0–0.9)
LYMPHOCYTES # BLD AUTO: 1.72 K/UL (ref 1–4.8)
LYMPHOCYTES NFR BLD: 12.6 % (ref 22–41)
MCH RBC QN AUTO: 28.6 PG (ref 27–33)
MCHC RBC AUTO-ENTMCNC: 31.8 G/DL (ref 33.6–35)
MCV RBC AUTO: 90 FL (ref 81.4–97.8)
MONOCYTES # BLD AUTO: 0.94 K/UL (ref 0–0.85)
MONOCYTES NFR BLD AUTO: 6.9 % (ref 0–13.4)
NEUTROPHILS # BLD AUTO: 10.73 K/UL (ref 2–7.15)
NEUTROPHILS NFR BLD: 78.4 % (ref 44–72)
NRBC # BLD AUTO: 0 K/UL
NRBC BLD AUTO-RTO: 0 /100 WBC
PHOSPHATE SERPL-MCNC: 4.3 MG/DL (ref 2.5–4.5)
PLATELET # BLD AUTO: 363 K/UL (ref 164–446)
PMV BLD AUTO: 9.6 FL (ref 9–12.9)
POTASSIUM SERPL-SCNC: 4 MMOL/L (ref 3.6–5.5)
RBC # BLD AUTO: 4.61 M/UL (ref 4.2–5.4)
SODIUM SERPL-SCNC: 135 MMOL/L (ref 135–145)
WBC # BLD AUTO: 13.7 K/UL (ref 4.8–10.8)

## 2017-11-11 PROCEDURE — A9270 NON-COVERED ITEM OR SERVICE: HCPCS | Performed by: INTERNAL MEDICINE

## 2017-11-11 PROCEDURE — 700111 HCHG RX REV CODE 636 W/ 250 OVERRIDE (IP): Performed by: HOSPITALIST

## 2017-11-11 PROCEDURE — 700102 HCHG RX REV CODE 250 W/ 637 OVERRIDE(OP): Performed by: PHARMACIST

## 2017-11-11 PROCEDURE — A9270 NON-COVERED ITEM OR SERVICE: HCPCS

## 2017-11-11 PROCEDURE — 99232 SBSQ HOSP IP/OBS MODERATE 35: CPT | Performed by: INTERNAL MEDICINE

## 2017-11-11 PROCEDURE — 36415 COLL VENOUS BLD VENIPUNCTURE: CPT

## 2017-11-11 PROCEDURE — 700102 HCHG RX REV CODE 250 W/ 637 OVERRIDE(OP)

## 2017-11-11 PROCEDURE — 770006 HCHG ROOM/CARE - MED/SURG/GYN SEMI*

## 2017-11-11 PROCEDURE — A9270 NON-COVERED ITEM OR SERVICE: HCPCS | Performed by: HOSPITALIST

## 2017-11-11 PROCEDURE — 85025 COMPLETE CBC W/AUTO DIFF WBC: CPT

## 2017-11-11 PROCEDURE — A9270 NON-COVERED ITEM OR SERVICE: HCPCS | Performed by: PHARMACIST

## 2017-11-11 PROCEDURE — 700102 HCHG RX REV CODE 250 W/ 637 OVERRIDE(OP): Performed by: HOSPITALIST

## 2017-11-11 PROCEDURE — 80069 RENAL FUNCTION PANEL: CPT

## 2017-11-11 PROCEDURE — 700102 HCHG RX REV CODE 250 W/ 637 OVERRIDE(OP): Performed by: INTERNAL MEDICINE

## 2017-11-11 PROCEDURE — 700111 HCHG RX REV CODE 636 W/ 250 OVERRIDE (IP): Performed by: INTERNAL MEDICINE

## 2017-11-11 RX ADMIN — MEMANTINE HYDROCHLORIDE 5 MG: 10 TABLET ORAL at 19:37

## 2017-11-11 RX ADMIN — MEMANTINE HYDROCHLORIDE 5 MG: 10 TABLET ORAL at 09:34

## 2017-11-11 RX ADMIN — ATORVASTATIN CALCIUM 20 MG: 20 TABLET, FILM COATED ORAL at 19:36

## 2017-11-11 RX ADMIN — STANDARDIZED SENNA CONCENTRATE AND DOCUSATE SODIUM 2 TABLET: 8.6; 5 TABLET, FILM COATED ORAL at 09:34

## 2017-11-11 RX ADMIN — LISINOPRIL 20 MG: 20 TABLET ORAL at 09:34

## 2017-11-11 RX ADMIN — POTASSIUM CHLORIDE 20 MEQ: 1500 TABLET, EXTENDED RELEASE ORAL at 09:34

## 2017-11-11 RX ADMIN — QUETIAPINE FUMARATE 50 MG: 25 TABLET ORAL at 19:36

## 2017-11-11 RX ADMIN — OMEPRAZOLE 20 MG: 20 CAPSULE, DELAYED RELEASE ORAL at 09:34

## 2017-11-11 RX ADMIN — CYANOCOBALAMIN TAB 500 MCG 1000 MCG: 500 TAB at 09:34

## 2017-11-11 RX ADMIN — DONEPEZIL HYDROCHLORIDE 20 MG: 5 TABLET, FILM COATED ORAL at 19:36

## 2017-11-11 RX ADMIN — POLYETHYLENE GLYCOL 3350 1 PACKET: 17 POWDER, FOR SOLUTION ORAL at 09:35

## 2017-11-11 RX ADMIN — CEFTRIAXONE SODIUM 1000 MG: 10 INJECTION, POWDER, FOR SOLUTION INTRAVENOUS at 09:50

## 2017-11-11 RX ADMIN — HYDROCHLOROTHIAZIDE 25 MG: 25 TABLET ORAL at 09:35

## 2017-11-11 RX ADMIN — ASPIRIN 81 MG: 81 TABLET, COATED ORAL at 09:34

## 2017-11-11 RX ADMIN — STANDARDIZED SENNA CONCENTRATE AND DOCUSATE SODIUM 2 TABLET: 8.6; 5 TABLET, FILM COATED ORAL at 19:37

## 2017-11-11 RX ADMIN — ENOXAPARIN SODIUM 40 MG: 100 INJECTION SUBCUTANEOUS at 09:35

## 2017-11-11 ASSESSMENT — PAIN SCALES - GENERAL
PAINLEVEL_OUTOF10: 0
PAINLEVEL_OUTOF10: 0

## 2017-11-11 ASSESSMENT — PATIENT HEALTH QUESTIONNAIRE - PHQ9
2. FEELING DOWN, DEPRESSED, IRRITABLE, OR HOPELESS: NOT AT ALL
1. LITTLE INTEREST OR PLEASURE IN DOING THINGS: NOT AT ALL
SUM OF ALL RESPONSES TO PHQ9 QUESTIONS 1 AND 2: 0
SUM OF ALL RESPONSES TO PHQ QUESTIONS 1-9: 0

## 2017-11-11 ASSESSMENT — LIFESTYLE VARIABLES: DO YOU DRINK ALCOHOL: NO

## 2017-11-11 NOTE — PROGRESS NOTES
Pt appeared to be sleeping this morning but awoke easily to verbal stimuli. Oriented to self. Disoriented to birthday, location, time, and situation. Breakfast had not been touched so assisted with feeding. CNA updated that patient will need to be a feed assist. Otherwise no acute events overnight and pt appears comfortable with no c/o of pain.     VSS. HR regular S1S2, BP stable. Pulses strong in all four extremities.     Lungs are clear in the RANDY and RUL. Diminished in the bases. No cough or dysphagia with breakfast or meds noted. However she chews and pockets her meds and would benefit from having them crushed in applesauce in the future.     Abd soft, rounded, and non-tender. BT active. Incontinent of stool and urine.     Skin in pink warm and dry. Q2 turns to prevent skin breakdown.     Board updated with plan of care. Call light within reach. Bed strip alarm in place and on.

## 2017-11-11 NOTE — PROGRESS NOTES
Hospital Medicine Interval Note  Date of Service:  11/11/2017    Chief Complaint  77 y.o.-year-old female with h/o dementia, HTN, and HLD admitted 11/7/2017 with ALOC and N/V secondary to UTI/sepsis.    Interval Problem Update  Mentation likely at baseline but still impaired.  Clinically appears comfortable.  VSS.  Afebrile.    11/11: PT/OT rec Home health, order placed.  WBC increase but no symptoms.    Consultants/Specialty  Pulmonary - S/O    Disposition  Group home vs SNF at d/c pending PT/OT recs.  SW following.     Review of Systems   Unable to perform ROS: Mental acuity      Physical Exam Laboratory/Imaging   Vitals:    11/10/17 1452 11/10/17 1915 11/11/17 0410 11/11/17 0702   BP: 148/88 147/89 133/68 122/50   Pulse: 66 82 75 87   Resp: 16 18 18 16   Temp: 36.4 °C (97.6 °F) 36.6 °C (97.9 °F) 36.1 °C (96.9 °F) 37.4 °C (99.3 °F)   SpO2: 90% 93% 96% 92%   Weight:       Height:         Physical Exam   Constitutional: No distress.   HENT:   Head: Normocephalic and atraumatic.   Mouth/Throat: No oropharyngeal exudate.   Eyes: Conjunctivae are normal. Right eye exhibits no discharge. Left eye exhibits no discharge.   Neck: Neck supple. No tracheal deviation present.   Cardiovascular: Normal rate, regular rhythm, normal heart sounds and intact distal pulses.    No murmur heard.  Pulmonary/Chest: Effort normal and breath sounds normal. No stridor. No respiratory distress. She has no wheezes.   Abdominal: Soft. Bowel sounds are normal. She exhibits no distension. There is no tenderness.   Musculoskeletal: She exhibits no edema or deformity.   Neurological: She is alert. No cranial nerve deficit.   Oriented x 1.   Skin: Skin is warm and dry. No rash noted. She is not diaphoretic. No erythema.    Lab Results   Component Value Date/Time    WBC 13.7 (H) 11/11/2017 02:39 AM    HEMOGLOBIN 13.2 11/11/2017 02:39 AM    HEMATOCRIT 41.5 11/11/2017 02:39 AM    PLATELETCT 363 11/11/2017 02:39 AM     Lab Results   Component Value  Date/Time    SODIUM 135 11/11/2017 02:39 AM    POTASSIUM 4.0 11/11/2017 02:39 AM    CHLORIDE 105 11/11/2017 02:39 AM    CO2 17 (L) 11/11/2017 02:39 AM    GLUCOSE 140 (H) 11/11/2017 02:39 AM    BUN 10 11/11/2017 02:39 AM    CREATININE 1.07 11/11/2017 02:39 AM      Assessment/Plan    * UTI (urinary tract infection)- (present on admission)   Assessment & Plan    - Urine cx positive for E. Coli.  Mentation has improved back to baseline, but she remains impaired due to baseline dementia.  - Continue ceftriaxone.   - Afebrile.  - WBC increased today, but no complaints of worsening symptoms        Septic shock (CMS-HCC)- (present on admission)   Assessment & Plan    - Resolved.  - Secondary to UTI.  - Lactic acid 1.5  - Afebrile.  - Continue antibx 7d total        Dementia with behavioral disturbance- (present on admission)   Assessment & Plan    - Mentation likely at baseline.  - Continue seroquel, namenda, and aricept.         Hyperlipidemia- (present on admission)   Assessment & Plan    - Continue lipitor.        Hypertension- (present on admission)   Assessment & Plan    - Stable.  - Continue lisinopril and hctz.             Reviewed items::  Radiology images reviewed, Labs reviewed and Medications reviewed  Almaguer catheter::  No Almaguer  DVT prophylaxis pharmacological::  Enoxaparin (Lovenox)  Ulcer Prophylaxis::  Not indicated  Antibiotics:  Treating active infection/contamination beyond 24 hours perioperative coverage

## 2017-11-11 NOTE — CARE PLAN
Problem: Communication  Goal: The ability to communicate needs accurately and effectively will improve  Outcome: PROGRESSING SLOWER THAN EXPECTED  Pt at cognitive baseline per the daughter. Hourly rounding to address needs otherwise she does not call.     Problem: Venous Thromboembolism (VTW)/Deep Vein Thrombosis (DVT) Prevention:  Goal: Patient will participate in Venous Thrombosis (VTE)/Deep Vein Thrombosis (DVT)Prevention Measures  Outcome: PROGRESSING AS EXPECTED  Pt receiving scheduled lovenox for VTE prophylaxis     Problem: Discharge Barriers/Planning  Goal: Patient's continuum of care needs will be met  Outcome: PROGRESSING AS EXPECTED  Barriers to d/c addressed each shift. SW working to arrange HH for discharge back to group home. Likely d/c tomorrow

## 2017-11-11 NOTE — PROGRESS NOTES
Oriented to self.  Denied any needs.  Incontinent of urine and stools.  Call light within reach.  Continues to have bed alarm.

## 2017-11-11 NOTE — FACE TO FACE
Face to Face Supporting Documentation - Home Health    The encounter with this patient was in whole or in part the primary reason for home health admission.    Date of encounter:   Patient:                    MRN:                       YOB: 2019  Nena Lamb  [id number]  1940     Home health to see patient for:  Skilled Nursing care for assessment, interventions & education, Medical social work consult, Home health aide, Physical Therapy evaluation and treatment and Occupational therapy evaluation and treatment    Skilled need for:  Exacerbation of Chronic Disease State Dementia    Skilled nursing interventions to include:  Comment: NOne    Homebound status evidenced by:  Need the aid of supportive devices such as crutches, canes, wheelchairs or walkers. Leaving home requires a considerable and taxing effort. There is a normal inability to leave the home.    Community Physician to provide follow up care: Vasquez Mary M.D.     Optional Interventions? No      I certify the face to face encounter for this home health care referral meets the CMS requirements and the encounter/clinical assessment with the patient was, in whole, or in part, for the medical condition(s) listed above, which is the primary reason for home health care. Based on my clinical findings: the service(s) are medically necessary, support the need for home health care, and the homebound criteria are met.  I certify that this patient has had a face to face encounter by myself.  Vonnie Liriano D.O. - NPI: [id number]

## 2017-11-11 NOTE — CARE PLAN
Problem: Safety  Goal: Will remain free from falls  Outcome: PROGRESSING AS EXPECTED  Bed alarm in place, call light within reach. Treaded socks on.     Problem: Knowledge Deficit  Goal: Knowledge of disease process/condition, treatment plan, diagnostic tests, and medications will improve  Outcome: PROGRESSING AS EXPECTED  IV antibiotic was given to the pt. Tolerated well.     Problem: Psychosocial Needs:  Goal: Level of anxiety will decrease  Outcome: PROGRESSING AS EXPECTED  Pt. Provided calming environment for the pt. Provided a listening ear to the pt.

## 2017-11-11 NOTE — CARE PLAN
Problem: Safety  Goal: Will remain free from falls    Intervention: Assess risk factors for falls   11/11/17 0032   OTHER   Mobility Status Assessment 2-2 Healthcare Providers Required for Assistance with Ambulation & Transfer   Continues to have bed alarm      Problem: Bowel/Gastric:  Goal: Will not experience complications related to bowel motility    Intervention: Assess baseline bowel pattern  LBM tonight

## 2017-11-12 ENCOUNTER — HOME HEALTH ADMISSION (OUTPATIENT)
Dept: HOME HEALTH SERVICES | Facility: HOME HEALTHCARE | Age: 77
End: 2017-11-12
Payer: MEDICARE

## 2017-11-12 PROBLEM — N17.9 AKI (ACUTE KIDNEY INJURY) (HCC): Status: ACTIVE | Noted: 2017-11-12

## 2017-11-12 LAB
ALBUMIN SERPL BCP-MCNC: 3.2 G/DL (ref 3.2–4.9)
BACTERIA BLD CULT: NORMAL
BACTERIA BLD CULT: NORMAL
BASOPHILS # BLD AUTO: 0.5 % (ref 0–1.8)
BASOPHILS # BLD: 0.03 K/UL (ref 0–0.12)
BUN SERPL-MCNC: 20 MG/DL (ref 8–22)
CALCIUM SERPL-MCNC: 8.7 MG/DL (ref 8.5–10.5)
CHLORIDE SERPL-SCNC: 103 MMOL/L (ref 96–112)
CO2 SERPL-SCNC: 23 MMOL/L (ref 20–33)
CREAT SERPL-MCNC: 1.48 MG/DL (ref 0.5–1.4)
EOSINOPHIL # BLD AUTO: 0.22 K/UL (ref 0–0.51)
EOSINOPHIL NFR BLD: 3.9 % (ref 0–6.9)
ERYTHROCYTE [DISTWIDTH] IN BLOOD BY AUTOMATED COUNT: 43.2 FL (ref 35.9–50)
GFR SERPL CREATININE-BSD FRML MDRD: 34 ML/MIN/1.73 M 2
GLUCOSE SERPL-MCNC: 126 MG/DL (ref 65–99)
HCT VFR BLD AUTO: 36.1 % (ref 37–47)
HGB BLD-MCNC: 11.9 G/DL (ref 12–16)
IMM GRANULOCYTES # BLD AUTO: 0.02 K/UL (ref 0–0.11)
IMM GRANULOCYTES NFR BLD AUTO: 0.4 % (ref 0–0.9)
LYMPHOCYTES # BLD AUTO: 1.73 K/UL (ref 1–4.8)
LYMPHOCYTES NFR BLD: 31 % (ref 22–41)
MCH RBC QN AUTO: 28.7 PG (ref 27–33)
MCHC RBC AUTO-ENTMCNC: 33 G/DL (ref 33.6–35)
MCV RBC AUTO: 87 FL (ref 81.4–97.8)
MONOCYTES # BLD AUTO: 0.52 K/UL (ref 0–0.85)
MONOCYTES NFR BLD AUTO: 9.3 % (ref 0–13.4)
NEUTROPHILS # BLD AUTO: 3.06 K/UL (ref 2–7.15)
NEUTROPHILS NFR BLD: 54.9 % (ref 44–72)
NRBC # BLD AUTO: 0 K/UL
NRBC BLD AUTO-RTO: 0 /100 WBC
PHOSPHATE SERPL-MCNC: 4.3 MG/DL (ref 2.5–4.5)
PLATELET # BLD AUTO: 284 K/UL (ref 164–446)
PMV BLD AUTO: 9.8 FL (ref 9–12.9)
POTASSIUM SERPL-SCNC: 3.4 MMOL/L (ref 3.6–5.5)
RBC # BLD AUTO: 4.15 M/UL (ref 4.2–5.4)
SIGNIFICANT IND 70042: NORMAL
SIGNIFICANT IND 70042: NORMAL
SITE SITE: NORMAL
SITE SITE: NORMAL
SODIUM SERPL-SCNC: 137 MMOL/L (ref 135–145)
SOURCE SOURCE: NORMAL
SOURCE SOURCE: NORMAL
WBC # BLD AUTO: 5.6 K/UL (ref 4.8–10.8)

## 2017-11-12 PROCEDURE — 700111 HCHG RX REV CODE 636 W/ 250 OVERRIDE (IP): Performed by: INTERNAL MEDICINE

## 2017-11-12 PROCEDURE — 99232 SBSQ HOSP IP/OBS MODERATE 35: CPT | Performed by: INTERNAL MEDICINE

## 2017-11-12 PROCEDURE — A9270 NON-COVERED ITEM OR SERVICE: HCPCS | Performed by: INTERNAL MEDICINE

## 2017-11-12 PROCEDURE — 700111 HCHG RX REV CODE 636 W/ 250 OVERRIDE (IP): Performed by: HOSPITALIST

## 2017-11-12 PROCEDURE — 36415 COLL VENOUS BLD VENIPUNCTURE: CPT

## 2017-11-12 PROCEDURE — 700102 HCHG RX REV CODE 250 W/ 637 OVERRIDE(OP): Performed by: INTERNAL MEDICINE

## 2017-11-12 PROCEDURE — 85025 COMPLETE CBC W/AUTO DIFF WBC: CPT

## 2017-11-12 PROCEDURE — 700102 HCHG RX REV CODE 250 W/ 637 OVERRIDE(OP): Performed by: HOSPITALIST

## 2017-11-12 PROCEDURE — 700102 HCHG RX REV CODE 250 W/ 637 OVERRIDE(OP): Performed by: PHARMACIST

## 2017-11-12 PROCEDURE — 770006 HCHG ROOM/CARE - MED/SURG/GYN SEMI*

## 2017-11-12 PROCEDURE — 700102 HCHG RX REV CODE 250 W/ 637 OVERRIDE(OP)

## 2017-11-12 PROCEDURE — 80069 RENAL FUNCTION PANEL: CPT

## 2017-11-12 PROCEDURE — A9270 NON-COVERED ITEM OR SERVICE: HCPCS | Performed by: PHARMACIST

## 2017-11-12 PROCEDURE — A9270 NON-COVERED ITEM OR SERVICE: HCPCS

## 2017-11-12 PROCEDURE — 700101 HCHG RX REV CODE 250: Performed by: INTERNAL MEDICINE

## 2017-11-12 PROCEDURE — A9270 NON-COVERED ITEM OR SERVICE: HCPCS | Performed by: HOSPITALIST

## 2017-11-12 RX ORDER — SODIUM CHLORIDE AND POTASSIUM CHLORIDE 300; 900 MG/100ML; MG/100ML
INJECTION, SOLUTION INTRAVENOUS CONTINUOUS
Status: DISCONTINUED | OUTPATIENT
Start: 2017-11-12 | End: 2017-11-13

## 2017-11-12 RX ADMIN — CEFTRIAXONE SODIUM 1000 MG: 10 INJECTION, POWDER, FOR SOLUTION INTRAVENOUS at 08:39

## 2017-11-12 RX ADMIN — ATORVASTATIN CALCIUM 20 MG: 20 TABLET, FILM COATED ORAL at 19:32

## 2017-11-12 RX ADMIN — MEMANTINE HYDROCHLORIDE 5 MG: 10 TABLET ORAL at 19:31

## 2017-11-12 RX ADMIN — POLYETHYLENE GLYCOL 3350 1 PACKET: 17 POWDER, FOR SOLUTION ORAL at 08:20

## 2017-11-12 RX ADMIN — POTASSIUM CHLORIDE AND SODIUM CHLORIDE: 900; 300 INJECTION, SOLUTION INTRAVENOUS at 14:57

## 2017-11-12 RX ADMIN — OMEPRAZOLE 20 MG: 20 CAPSULE, DELAYED RELEASE ORAL at 08:19

## 2017-11-12 RX ADMIN — CYANOCOBALAMIN TAB 500 MCG 1000 MCG: 500 TAB at 08:19

## 2017-11-12 RX ADMIN — MEMANTINE HYDROCHLORIDE 5 MG: 10 TABLET ORAL at 08:20

## 2017-11-12 RX ADMIN — DONEPEZIL HYDROCHLORIDE 20 MG: 5 TABLET, FILM COATED ORAL at 19:31

## 2017-11-12 RX ADMIN — ASPIRIN 81 MG: 81 TABLET, COATED ORAL at 08:19

## 2017-11-12 RX ADMIN — QUETIAPINE FUMARATE 50 MG: 25 TABLET ORAL at 19:30

## 2017-11-12 RX ADMIN — POTASSIUM CHLORIDE 20 MEQ: 1500 TABLET, EXTENDED RELEASE ORAL at 08:20

## 2017-11-12 RX ADMIN — ENOXAPARIN SODIUM 40 MG: 100 INJECTION SUBCUTANEOUS at 08:19

## 2017-11-12 RX ADMIN — HYDROCHLOROTHIAZIDE 25 MG: 25 TABLET ORAL at 08:19

## 2017-11-12 ASSESSMENT — PAIN SCALES - GENERAL
PAINLEVEL_OUTOF10: 0
PAINLEVEL_OUTOF10: 0

## 2017-11-12 NOTE — PROGRESS NOTES
Son at bedside and updated on plan of care.    Pt pulled out PIV. Small amt of blood on sheets but bleeding has stopped. Checked entire floor and bed and unable to find iv    Son advises that she frequently pulls out ivs and pulls at clothing and tubes.

## 2017-11-12 NOTE — PROGRESS NOTES
Received bedside report, pt was eating dinner that time.  RN went back to give her night medications.  Pt already pulled out iv that was inserted this afternoon by day shift.  Will leave iv out for tonight since she gets iv antibiotic once a day.

## 2017-11-12 NOTE — ASSESSMENT & PLAN NOTE
Mild, I suspect from volume depletion as she has not been eating or drinking much and needs que-ing  -NS at 83/hr  -Monitor Cr Daily  -Watch UOP  -Hold nephrotoxins

## 2017-11-12 NOTE — PROGRESS NOTES
Oriented to self.  Denied any needs.  Incontinent of urine.  Cleaned.  Continues to have strip and built in bed alarm.

## 2017-11-12 NOTE — PROGRESS NOTES
Assumed care of patient at 0700. Patient lying in bed, assisted to shower chair with assist of 1 for shower. Patient had many large bowel movements this am and incontinent of urine several times. Sacrum and buttocks red but blanchable, barrier cream applied. Patient pulled out IV this am, new IV placed and IVF started for depleted potassium. Koban and sock applied over IV to prevent patient from pulling out. Patient up to chair for lunch. Call bell and personal items within reach, bed exit alarm armed. Will continue to monitor.     Krysta Simon RN

## 2017-11-12 NOTE — DISCHARGE PLANNING
Received choice form from [facility] at 1141,  Referral sent to [facility] at 1137 on 11-12-17.

## 2017-11-12 NOTE — PROGRESS NOTES
Hospital Medicine Interval Note  Date of Service:  11/12/2017    Chief Complaint  77 y.o.-year-old female with h/o dementia, HTN, and HLD admitted 11/7/2017 with ALOC and N/V secondary to UTI/sepsis.    Interval Problem Update  Mentation likely at baseline but still impaired.  Clinically appears comfortable.  VSS.  Afebrile.    11/11: PT/OT rec Home health, order placed.  WBC increase but no symptoms.  11/12: Cr slightly up, WBC normalized.  Lisinopril held and gentle NS started.  Encourage PO intake    Consultants/Specialty  Pulmonary - S/O    Disposition  Group home vs SNF at d/c pending PT/OT recs.  SW following.       Review of Systems   Unable to perform ROS: Mental acuity      Physical Exam Laboratory/Imaging   Vitals:    11/11/17 1512 11/11/17 2100 11/12/17 0426 11/12/17 0715   BP: 135/72 114/55 105/52 105/47   Pulse: 72 88 71 65   Resp: 18 16 16 15   Temp: 36.5 °C (97.7 °F) 36.3 °C (97.3 °F) 37 °C (98.6 °F) 36.3 °C (97.3 °F)   SpO2: 93% 93% 96% 93%   Weight:       Height:         Physical Exam   Constitutional: No distress.   HENT:   Head: Normocephalic and atraumatic.   Mouth/Throat: No oropharyngeal exudate.   Eyes: Conjunctivae are normal. Right eye exhibits no discharge. Left eye exhibits no discharge.   Neck: Neck supple. No tracheal deviation present.   Cardiovascular: Normal rate, regular rhythm, normal heart sounds and intact distal pulses.    No murmur heard.  Pulmonary/Chest: Effort normal and breath sounds normal. No stridor. No respiratory distress. She has no wheezes.   Abdominal: Soft. Bowel sounds are normal. She exhibits no distension. There is no tenderness.   Musculoskeletal: She exhibits no edema or deformity.   Neurological: She is alert. No cranial nerve deficit.   Oriented x 1.   Skin: Skin is warm and dry. No rash noted. She is not diaphoretic. No erythema.    Lab Results   Component Value Date/Time    WBC 5.6 11/12/2017 02:35 AM    HEMOGLOBIN 11.9 (L) 11/12/2017 02:35 AM    HEMATOCRIT  36.1 (L) 11/12/2017 02:35 AM    PLATELETCT 284 11/12/2017 02:35 AM     Lab Results   Component Value Date/Time    SODIUM 137 11/12/2017 02:35 AM    POTASSIUM 3.4 (L) 11/12/2017 02:35 AM    CHLORIDE 103 11/12/2017 02:35 AM    CO2 23 11/12/2017 02:35 AM    GLUCOSE 126 (H) 11/12/2017 02:35 AM    BUN 20 11/12/2017 02:35 AM    CREATININE 1.48 (H) 11/12/2017 02:35 AM      Assessment/Plan    * UTI (urinary tract infection)- (present on admission)   Assessment & Plan    - Urine cx positive for E. Coli.  Mentation has improved back to baseline, but she remains impaired due to baseline dementia.  - Continue ceftriaxone.   - Afebrile.  - WBC increased today, but no complaints of worsening symptoms        Septic shock (CMS-HCC)- (present on admission)   Assessment & Plan    - Resolved.  - Secondary to UTI.  - Lactic acid 1.5  - Afebrile.  - Continue antibx 7d total        KELSI (acute kidney injury) (CMS-HCC)   Assessment & Plan    Mild, I suspect from volume depletion as she has not been eating or drinking much and needs que-ing  -NS at 83/hr  -Monitor Cr Daily  -Watch UOP  -Hold nephrotoxins          Diet-controlled diabetes mellitus (CMS-HCC)- (present on admission)   Assessment & Plan    Sugars running mildly elevated, not enough to need SSI.  -Monitor        Dementia with behavioral disturbance- (present on admission)   Assessment & Plan    - Mentation likely at baseline.  - Continue seroquel, namenda, and aricept.         Hyperlipidemia- (present on admission)   Assessment & Plan    - Continue lipitor.        Hypertension- (present on admission)   Assessment & Plan    - Stable.  - Continue lisinopril and hctz.             Reviewed items::  Radiology images reviewed, Labs reviewed and Medications reviewed  Almaguer catheter::  No Almaguer  DVT prophylaxis pharmacological::  Enoxaparin (Lovenox)  Ulcer Prophylaxis::  Not indicated  Antibiotics:  Treating active infection/contamination beyond 24 hours perioperative coverage

## 2017-11-12 NOTE — DISCHARGE PLANNING
Medical Social Work    SW contacted pt's daughter, , via phone and obtained HH choice.  requested referral be sent to [facility] . ELY sent completed choice to [facility] for processing.  and RN aware there may not be an acceptance or denial today due to it being the weekend.

## 2017-11-13 VITALS
HEIGHT: 67 IN | SYSTOLIC BLOOD PRESSURE: 112 MMHG | HEART RATE: 64 BPM | WEIGHT: 142.86 LBS | OXYGEN SATURATION: 96 % | BODY MASS INDEX: 22.42 KG/M2 | RESPIRATION RATE: 14 BRPM | TEMPERATURE: 98.1 F | DIASTOLIC BLOOD PRESSURE: 62 MMHG

## 2017-11-13 LAB
ALBUMIN SERPL BCP-MCNC: 3.3 G/DL (ref 3.2–4.9)
BUN SERPL-MCNC: 20 MG/DL (ref 8–22)
CALCIUM SERPL-MCNC: 9.3 MG/DL (ref 8.5–10.5)
CHLORIDE SERPL-SCNC: 106 MMOL/L (ref 96–112)
CO2 SERPL-SCNC: 22 MMOL/L (ref 20–33)
CREAT SERPL-MCNC: 1.04 MG/DL (ref 0.5–1.4)
GFR SERPL CREATININE-BSD FRML MDRD: 51 ML/MIN/1.73 M 2
GLUCOSE SERPL-MCNC: 111 MG/DL (ref 65–99)
PHOSPHATE SERPL-MCNC: 3.2 MG/DL (ref 2.5–4.5)
POTASSIUM SERPL-SCNC: 4 MMOL/L (ref 3.6–5.5)
SODIUM SERPL-SCNC: 136 MMOL/L (ref 135–145)

## 2017-11-13 PROCEDURE — 99239 HOSP IP/OBS DSCHRG MGMT >30: CPT | Performed by: INTERNAL MEDICINE

## 2017-11-13 PROCEDURE — A9270 NON-COVERED ITEM OR SERVICE: HCPCS | Performed by: HOSPITALIST

## 2017-11-13 PROCEDURE — 36415 COLL VENOUS BLD VENIPUNCTURE: CPT

## 2017-11-13 PROCEDURE — 700102 HCHG RX REV CODE 250 W/ 637 OVERRIDE(OP): Performed by: HOSPITALIST

## 2017-11-13 PROCEDURE — 700102 HCHG RX REV CODE 250 W/ 637 OVERRIDE(OP)

## 2017-11-13 PROCEDURE — A9270 NON-COVERED ITEM OR SERVICE: HCPCS | Performed by: PHARMACIST

## 2017-11-13 PROCEDURE — 700102 HCHG RX REV CODE 250 W/ 637 OVERRIDE(OP): Performed by: PHARMACIST

## 2017-11-13 PROCEDURE — A9270 NON-COVERED ITEM OR SERVICE: HCPCS

## 2017-11-13 PROCEDURE — A9270 NON-COVERED ITEM OR SERVICE: HCPCS | Performed by: INTERNAL MEDICINE

## 2017-11-13 PROCEDURE — 700111 HCHG RX REV CODE 636 W/ 250 OVERRIDE (IP): Performed by: HOSPITALIST

## 2017-11-13 PROCEDURE — 700102 HCHG RX REV CODE 250 W/ 637 OVERRIDE(OP): Performed by: INTERNAL MEDICINE

## 2017-11-13 PROCEDURE — 80069 RENAL FUNCTION PANEL: CPT

## 2017-11-13 PROCEDURE — 700111 HCHG RX REV CODE 636 W/ 250 OVERRIDE (IP): Performed by: INTERNAL MEDICINE

## 2017-11-13 RX ORDER — AMLODIPINE BESYLATE 10 MG/1
10 TABLET ORAL DAILY
Qty: 30 TAB | Refills: 1 | Status: SHIPPED | OUTPATIENT
Start: 2017-11-13 | End: 2017-12-28 | Stop reason: SDUPTHER

## 2017-11-13 RX ORDER — AMOXICILLIN 250 MG
2 CAPSULE ORAL 2 TIMES DAILY
Qty: 60 TAB | Refills: 0 | Status: SHIPPED | OUTPATIENT
Start: 2017-11-13 | End: 2017-12-13

## 2017-11-13 RX ORDER — POTASSIUM CHLORIDE 20 MEQ/1
40 TABLET, EXTENDED RELEASE ORAL DAILY
Qty: 180 TAB | Refills: 1 | Status: SHIPPED | OUTPATIENT
Start: 2017-11-13 | End: 2018-01-29 | Stop reason: SDUPTHER

## 2017-11-13 RX ORDER — CEFDINIR 300 MG/1
300 CAPSULE ORAL 2 TIMES DAILY
Qty: 6 CAP | Refills: 0 | Status: SHIPPED | OUTPATIENT
Start: 2017-11-14 | End: 2017-11-17

## 2017-11-13 RX ADMIN — OMEPRAZOLE 20 MG: 20 CAPSULE, DELAYED RELEASE ORAL at 08:02

## 2017-11-13 RX ADMIN — POTASSIUM CHLORIDE 20 MEQ: 1500 TABLET, EXTENDED RELEASE ORAL at 08:02

## 2017-11-13 RX ADMIN — ASPIRIN 81 MG: 81 TABLET, COATED ORAL at 08:02

## 2017-11-13 RX ADMIN — CEFTRIAXONE 1 G: 1 INJECTION, SOLUTION INTRAVENOUS at 08:02

## 2017-11-13 RX ADMIN — HYDROCHLOROTHIAZIDE 25 MG: 25 TABLET ORAL at 08:02

## 2017-11-13 RX ADMIN — CYANOCOBALAMIN TAB 500 MCG 1000 MCG: 500 TAB at 08:03

## 2017-11-13 RX ADMIN — MEMANTINE HYDROCHLORIDE 5 MG: 10 TABLET ORAL at 08:03

## 2017-11-13 ASSESSMENT — PAIN SCALES - GENERAL: PAINLEVEL_OUTOF10: 0

## 2017-11-13 NOTE — CARE PLAN
Problem: Safety  Goal: Will remain free from falls  Outcome: PROGRESSING AS EXPECTED  Patient with non skid socks on, bed and chair exit alarms armed, bed locked in low position.

## 2017-11-13 NOTE — CARE PLAN
Problem: Safety  Goal: Will remain free from falls    Intervention: Assess risk factors for falls  Continues to have bed alarm       Problem: Skin Integrity  Goal: Risk for impaired skin integrity will decrease    Intervention: Assess risk factors for impaired skin integrity and/or pressure ulcers  Waffle cushion mattress overlay

## 2017-11-13 NOTE — CARE PLAN
Problem: Bowel/Gastric:  Goal: Normal bowel function is maintained or improved  Outcome: PROGRESSING AS EXPECTED  Patient had large soft BM today, oral intake adequate

## 2017-11-13 NOTE — DISCHARGE PLANNING
Received call from [facility] [place], patient will need transport to her group home. Referral sent to [facility] transport.

## 2017-11-13 NOTE — DISCHARGE INSTRUCTIONS
Discharge Instructions    Discharged to group home by medical transportation with escort. Discharged via wheelchair, hospital escort: Refused.  Special equipment needed: Not Applicable    Be sure to schedule a follow-up appointment with your primary care doctor or any specialists as instructed.     Discharge Plan:   Diet Plan: Discussed  Activity Level: Discussed  Confirmed Follow up Appointment: Appointment Scheduled  Confirmed Symptoms Management: Discussed  Medication Reconciliation Updated: Yes    I understand that a diet low in cholesterol, fat, and sodium is recommended for good health. Unless I have been given specific instructions below for another diet, I accept this instruction as my diet prescription.   Other diet: regular    Special Instructions: Sepsis, Adult  Sepsis is a serious infection of your blood or tissues that affects your whole body. The infection that causes sepsis may be bacterial, viral, fungal, or parasitic. Sepsis may be life threatening. Sepsis can cause your blood pressure to drop. This may result in shock. Shock causes your central nervous system and your organs to stop working correctly.   RISK FACTORS  Sepsis can happen in anyone, but it is more likely to happen in people who have weakened immune systems.  SIGNS AND SYMPTOMS   Symptoms of sepsis can include:  · Fever or low body temperature (hypothermia).  · Rapid breathing (hyperventilation).  · Chills.  · Rapid heartbeat (tachycardia).  · Confusion or light-headedness.  · Trouble breathing.  · Urinating much less than usual.  · Cool, clammy skin or red, flushed skin.  · Other problems with the heart, kidneys, or brain.  DIAGNOSIS   Your health care provider will likely do tests to look for an infection, to see if the infection has spread to your blood, and to see how serious your condition is. Tests can include:  · Blood tests, including cultures of your blood.  · Cultures of other fluids from your body, such as:  ¨ Urine.  ¨ Pus  from wounds.  ¨ Mucus coughed up from your lungs.  · Urine tests other than cultures.  · X-ray exams or other imaging tests.  TREATMENT   Treatment will begin with elimination of the source of infection. If your sepsis is likely caused by a bacterial or fungal infection, you will be given antibiotic or antifungal medicines.  You may also receive:  · Oxygen.  · Fluids through an IV tube.  · Medicines to increase your blood pressure.  · A machine to clean your blood (dialysis) if your kidneys fail.  · A machine to help you breathe if your lungs fail.  SEEK IMMEDIATE MEDICAL CARE IF:  You get an infection or develop any of the signs and symptoms of sepsis after surgery or a hospitalization.     This information is not intended to replace advice given to you by your health care provider. Make sure you discuss any questions you have with your health care provider.     Document Released: 09/15/2004 Document Revised: 05/03/2016 Document Reviewed: 08/25/2014  [employer] Interactive Patient Education ©2016 [employer]      · Is patient discharged on Warfarin / Coumadin?   No     · Is patient Post Blood Transfusion?  No    Depression / Suicide Risk    As you are discharged from this [facility] facility, it is important to learn how to keep safe from harming yourself.    Recognize the warning signs:  · Abrupt changes in personality, positive or negative- including increase in energy   · Giving away possessions  · Change in eating patterns- significant weight changes-  positive or negative  · Change in sleeping patterns- unable to sleep or sleeping all the time   · Unwillingness or inability to communicate  · Depression  · Unusual sadness, discouragement and loneliness  · Talk of wanting to die  · Neglect of personal appearance   · Rebelliousness- reckless behavior  · Withdrawal from people/activities they love  · Confusion- inability to concentrate     If you or a loved one observes any of these behaviors or has concerns  about self-harm, here's what you can do:  · Talk about it- your feelings and reasons for harming yourself  · Remove any means that you might use to hurt yourself (examples: pills, rope, extension cords, firearm)  · Get professional help from the community (Mental Health, Substance Abuse, psychological counseling)  · Do not be alone:Call your Safe Contact- someone whom you trust who will be there for you.  · Call your local CRISIS HOTLINE 510-9460 or 162-631-3443  · Call your local Children's Mobile Crisis Response Team [place] (697) 086-6353 or www.OmniPV  · Call the toll free National Suicide Prevention Hotlines   · National Suicide Prevention Lifeline 271-034-NIRD (6884)  · FetchBack Line Network 800-SUICIDE (586-1580)      Urinary Tract Infection  Urinary tract infections (UTIs) can develop anywhere along your urinary tract. Your urinary tract is your body's drainage system for removing wastes and extra water. Your urinary tract includes two kidneys, two ureters, a bladder, and a urethra. Your kidneys are a pair of bean-shaped organs. Each kidney is about the size of your fist. They are located below your ribs, one on each side of your spine.  CAUSES  Infections are caused by microbes, which are microscopic organisms, including fungi, viruses, and bacteria. These organisms are so small that they can only be seen through a microscope. Bacteria are the microbes that most commonly cause UTIs.  SYMPTOMS   Symptoms of UTIs may vary by age and gender of the patient and by the location of the infection. Symptoms in young women typically include a frequent and intense urge to urinate and a painful, burning feeling in the bladder or urethra during urination. Older women and men are more likely to be tired, shaky, and weak and have muscle aches and abdominal pain. A fever may mean the infection is in your kidneys. Other symptoms of a kidney infection include pain in your back or sides below the ribs,  nausea, and vomiting.  DIAGNOSIS  To diagnose a UTI, your caregiver will ask you about your symptoms. Your caregiver also will ask to provide a urine sample. The urine sample will be tested for bacteria and white blood cells. White blood cells are made by your body to help fight infection.  TREATMENT   Typically, UTIs can be treated with medication. Because most UTIs are caused by a bacterial infection, they usually can be treated with the use of antibiotics. The choice of antibiotic and length of treatment depend on your symptoms and the type of bacteria causing your infection.  HOME CARE INSTRUCTIONS  · If you were prescribed antibiotics, take them exactly as your caregiver instructs you. Finish the medication even if you feel better after you have only taken some of the medication.  · Drink enough water and fluids to keep your urine clear or pale yellow.  · Avoid caffeine, tea, and carbonated beverages. They tend to irritate your bladder.  · Empty your bladder often. Avoid holding urine for long periods of time.  · Empty your bladder before and after sexual intercourse.  · After a bowel movement, women should cleanse from front to back. Use each tissue only once.  SEEK MEDICAL CARE IF:   · You have back pain.  · You develop a fever.  · Your symptoms do not begin to resolve within 3 days.  SEEK IMMEDIATE MEDICAL CARE IF:   · You have severe back pain or lower abdominal pain.  · You develop chills.  · You have nausea or vomiting.  · You have continued burning or discomfort with urination.  MAKE SURE YOU:   · Understand these instructions.  · Will watch your condition.  · Will get help right away if you are not doing well or get worse.     This information is not intended to replace advice given to you by your health care provider. Make sure you discuss any questions you have with your health care provider.     Document Released: 09/27/2006 Document Revised: 01/08/2016 Document Reviewed: 01/25/2013  [employer]  Interactive Patient Education ©2016 [email or web address]    Nausea and Vomiting  Nausea is a sick feeling that often comes before throwing up (vomiting). Vomiting is a reflex where stomach contents come out of your mouth. Vomiting can cause severe loss of body fluids (dehydration). Children and elderly adults can become dehydrated quickly, especially if they also have diarrhea. Nausea and vomiting are symptoms of a condition or disease. It is important to find the cause of your symptoms.  CAUSES   · Direct irritation of the stomach lining. This irritation can result from increased acid production (gastroesophageal reflux disease), infection, food poisoning, taking certain medicines (such as nonsteroidal anti-inflammatory drugs), alcohol use, or tobacco use.  · Signals from the brain. These signals could be caused by a headache, heat exposure, an inner ear disturbance, increased pressure in the brain from injury, infection, a tumor, or a concussion, pain, emotional stimulus, or metabolic problems.  · An obstruction in the gastrointestinal tract (bowel obstruction).  · Illnesses such as diabetes, hepatitis, gallbladder problems, appendicitis, kidney problems, cancer, sepsis, atypical symptoms of a heart attack, or eating disorders.  · Medical treatments such as chemotherapy and radiation.  · Receiving medicine that makes you sleep (general anesthetic) during surgery.  DIAGNOSIS  Your caregiver may ask for tests to be done if the problems do not improve after a few days. Tests may also be done if symptoms are severe or if the reason for the nausea and vomiting is not clear. Tests may include:  · Urine tests.  · Blood tests.  · Stool tests.  · Cultures (to look for evidence of infection).  · X-rays or other imaging studies.  Test results can help your caregiver make decisions about treatment or the need for additional tests.  TREATMENT  You need to stay well hydrated. Drink frequently but in small amounts. You may wish to  drink water, sports drinks, clear broth, or eat frozen ice pops or gelatin dessert to help stay hydrated. When you eat, eating slowly may help prevent nausea. There are also some antinausea medicines that may help prevent nausea.  HOME CARE INSTRUCTIONS   · Take all medicine as directed by your caregiver.  · If you do not have an appetite, do not force yourself to eat. However, you must continue to drink fluids.  · If you have an appetite, eat a normal diet unless your caregiver tells you differently.  ¨ Eat a variety of complex carbohydrates (rice, wheat, potatoes, bread), lean meats, yogurt, fruits, and vegetables.  ¨ Avoid high-fat foods because they are more difficult to digest.  · Drink enough water and fluids to keep your urine clear or pale yellow.  · If you are dehydrated, ask your caregiver for specific rehydration instructions. Signs of dehydration may include:  ¨ Severe thirst.  ¨ Dry lips and mouth.  ¨ Dizziness.  ¨ Dark urine.  ¨ Decreasing urine frequency and amount.  ¨ Confusion.  ¨ Rapid breathing or pulse.  SEEK IMMEDIATE MEDICAL CARE IF:   · You have blood or brown flecks (like coffee grounds) in your vomit.  · You have black or bloody stools.  · You have a severe headache or stiff neck.  · You are confused.  · You have severe abdominal pain.  · You have chest pain or trouble breathing.  · You do not urinate at least once every 8 hours.  · You develop cold or clammy skin.  · You continue to vomit for longer than 24 to 48 hours.  · You have a fever.  MAKE SURE YOU:   · Understand these instructions.  · Will watch your condition.  · Will get help right away if you are not doing well or get worse.     This information is not intended to replace advice given to you by your health care provider. Make sure you discuss any questions you have with your health care provider.     Document Released: 12/18/2006 Document Revised: 03/11/2013 Document Reviewed: 05/16/2012  [email or web address] Patient Education  ©2016 [employer].

## 2017-11-13 NOTE — PROGRESS NOTES
Assumed care of pt at 0730. A/Ox1, discussed plan of care. Pt on room air, pt receiving IV antibiotics, IV intact, received orders for pt to discharge. All needs met at this time. Bed in lowest position, treaded socks on, personal belongings and call light within reach, instructed to call for any assistance.

## 2017-11-13 NOTE — DISCHARGE PLANNING
Spoke with Dionne in [facility], they have transport scheduled for 1400 today.  THAD Hi advised.

## 2017-11-13 NOTE — PROGRESS NOTES
Received discharge orders. Removed IV. Went over discharge instructions with pt and called daughter. All questions answered, patient feels safe to discharge. Patient went to group home with [facility]. All belongings gathered and patient dressed.

## 2017-11-13 NOTE — DISCHARGE PLANNING
Medical Social Work  PC from patient's daughter, verbal approval given for d/c.   PC to [facility]'s group [facility], [facility], they will be here at 1:00 to  patient.    Informed patient's nurse and charge.

## 2017-11-13 NOTE — PROGRESS NOTES
Oriented to self.  More alert tonight.  Was removing coban and sock over iv site.  Re applied.  Keep bending arm while asleep.  Continues to have bed alarm.

## 2017-11-13 NOTE — DISCHARGE PLANNING
Medical Social Work  Informed by Dr. Liriano patient is medically cleared to go back to group home.    PC to patient's daughter, message left to call me.

## 2017-11-13 NOTE — DISCHARGE SUMMARY
CHIEF COMPLAINT ON ADMISSION  Chief Complaint   Patient presents with   • ALOC     less alert than baseline   • N/V     pta        CODE STATUS  DNR    HPI & HOSPITAL COURSE  This is a 77 y.o.-year-old female with h/o dementia, HTN, and HLD admitted 11/7/2017 with ALOC and N/V secondary to UTI/sepsis.  She was started on IVF and her KELSI improved.  She was started on empiric abx and mental status improved to her baseline which was still impaired.    She had low PO intake and required frequent reminders and encouragement to eat.  Her creatinine slightly increased when IVF were discontinued.  When resumed it was improved so she was again encouraged about her PO intake.    Abx will be continued on DC  PT/OT recommended home health which was arranged for her to continue at her group home.       Therefore, she is discharged in good and stable condition with close outpatient follow-up.    SPECIFIC OUTPATIENT FOLLOW-UP  F/U with PCP at next available apt  F/U with pt/ot at group home    DISCHARGE PROBLEM LIST  Principal Problem:    UTI (urinary tract infection) POA: Yes  Active Problems:    Septic shock ([facility]) POA: Yes    Hypertension POA: Yes    Hyperlipidemia POA: Yes    Dementia with behavioral disturbance POA: Yes    Diet-controlled diabetes mellitus (CMS-HCC) POA: Yes    DNR (do not resuscitate) POA: Yes    KELSI (acute kidney injury) (CMS-HCC) POA: Unknown  Resolved Problems:    * No resolved hospital problems. *      FOLLOW UP  Future Appointments  Date Time Provider Department Center   11/16/2017 2:40 PM Trish Engel M.D. [facility]     No follow-up provider specified.    MEDICATIONS ON DISCHARGE   Nena Lamb   Home Medication Instructions ELINA:[id number]    Printed on:11/13/17 1235   Medication Information                      amlodipine (NORVASC) 10 MG Tab  Take 1 Tab by mouth every day.             aspirin EC (ECOTRIN) 81 MG Tablet Delayed Response  Take 81 mg by mouth every day.             atorvastatin  (LIPITOR) 20 MG Tab  Take 20 mg by mouth every evening.             cefdinir (OMNICEF) 300 MG Cap  Take 1 Cap by mouth 2 times a day for 3 days.             cetirizine (ZYRTEC) 10 MG Tab  Take 10 mg by mouth every day.             Cyanocobalamin (VITAMIN B-12) 1000 MCG Tab  Take 1 Tab by mouth every day.             Donepezil HCl 23 MG Tab  Take 23 mg by mouth every evening.             lorazepam (ATIVAN) 0.5 MG Tab  Take 0.5 mg by mouth every four hours as needed for Anxiety.             Memantine HCl ER (NAMENDA XR) 14 MG CAPSULE SR 24 HR  Take 1 Capsule by mouth every day.             omeprazole (PRILOSEC) 20 MG delayed-release capsule  TAKE (1) CAPSULE BY MOUTH ONCE DAILY.             polyethylene glycol/lytes (MIRALAX) Pack  Take 17 g by mouth every day.             potassium chloride SA (KDUR) 20 MEQ Tab CR  Take 2 Tabs by mouth every day.             quetiapine (SEROQUEL) 50 MG tablet  Take once tablet in the evening             senna-docusate (PERICOLACE OR SENOKOT S) 8.6-50 MG Tab  Take 2 Tabs by mouth 2 Times a Day.                 DIET  Orders Placed This Encounter   Procedures   • Diet Order     Standing Status:   Standing     Number of Occurrences:   1     Order Specific Question:   Diet:     Answer:   Regular [1]       ACTIVITY  As tolerated.  Weight bearing as tolerated      CONSULTATIONS  None    PROCEDURES  None    LABORATORY  Lab Results   Component Value Date/Time    SODIUM 136 11/13/2017 03:09 AM    POTASSIUM 4.0 11/13/2017 03:09 AM    CHLORIDE 106 11/13/2017 03:09 AM    CO2 22 11/13/2017 03:09 AM    GLUCOSE 111 (H) 11/13/2017 03:09 AM    BUN 20 11/13/2017 03:09 AM    CREATININE 1.04 11/13/2017 03:09 AM        Lab Results   Component Value Date/Time    WBC 5.6 11/12/2017 02:35 AM    HEMOGLOBIN 11.9 (L) 11/12/2017 02:35 AM    HEMATOCRIT 36.1 (L) 11/12/2017 02:35 AM    PLATELETCT 284 11/12/2017 02:35 AM        Total time of the discharge process exceeds 38 minutes

## 2017-11-14 ENCOUNTER — PATIENT OUTREACH (OUTPATIENT)
Dept: HEALTH INFORMATION MANAGEMENT | Facility: OTHER | Age: 77
End: 2017-11-14

## 2017-11-15 ENCOUNTER — HOME CARE VISIT (OUTPATIENT)
Dept: HOME HEALTH SERVICES | Facility: HOME HEALTHCARE | Age: 77
End: 2017-11-15

## 2017-11-15 NOTE — PROGRESS NOTES
11/14/17 4:35pm:  First attempt CM outreach call to daughter post hospital follow-up. VM left requesting return call to  at [phone].    11/15/17 11:00am:  CM second attempt at outreach. VM left for daughter to return CM call at [phone].

## 2017-11-17 ENCOUNTER — PATIENT OUTREACH (OUTPATIENT)
Dept: HEALTH INFORMATION MANAGEMENT | Facility: OTHER | Age: 77
End: 2017-11-17

## 2017-11-17 ENCOUNTER — HOME CARE VISIT (OUTPATIENT)
Dept: HOME HEALTH SERVICES | Facility: HOME HEALTHCARE | Age: 77
End: 2017-11-17
Payer: MEDICARE

## 2017-11-17 VITALS
WEIGHT: 133.4 LBS | DIASTOLIC BLOOD PRESSURE: 60 MMHG | BODY MASS INDEX: 20.89 KG/M2 | OXYGEN SATURATION: 96 % | RESPIRATION RATE: 22 BRPM | SYSTOLIC BLOOD PRESSURE: 100 MMHG | HEART RATE: 90 BPM | TEMPERATURE: 96.4 F

## 2017-11-17 PROCEDURE — G0162 HHC RN E&M PLAN SVS, 15 MIN: HCPCS

## 2017-11-17 PROCEDURE — 665998 HH PPS REVENUE CREDIT

## 2017-11-17 PROCEDURE — 665001 SOC-HOME HEALTH

## 2017-11-17 PROCEDURE — 665999 HH PPS REVENUE DEBIT

## 2017-11-17 SDOH — ECONOMIC STABILITY: HOUSING INSECURITY: UNSAFE COOKING RANGE AREA: 0

## 2017-11-17 SDOH — ECONOMIC STABILITY: HOUSING INSECURITY: UNSAFE APPLIANCES: 0

## 2017-11-17 ASSESSMENT — PATIENT HEALTH QUESTIONNAIRE - PHQ9
1. LITTLE INTEREST OR PLEASURE IN DOING THINGS: 00
2. FEELING DOWN, DEPRESSED, IRRITABLE, OR HOPELESS: 00

## 2017-11-17 ASSESSMENT — ACTIVITIES OF DAILY LIVING (ADL)
OASIS_M1830: 03
HOME_HEALTH_OASIS: 02

## 2017-11-17 ASSESSMENT — ENCOUNTER SYMPTOMS
DIFFICULTY THINKING: 1
POOR JUDGMENT: 1

## 2017-11-18 PROCEDURE — 665999 HH PPS REVENUE DEBIT

## 2017-11-18 PROCEDURE — 665998 HH PPS REVENUE CREDIT

## 2017-11-19 PROCEDURE — 665999 HH PPS REVENUE DEBIT

## 2017-11-19 PROCEDURE — 665998 HH PPS REVENUE CREDIT

## 2017-11-20 ENCOUNTER — TELEPHONE (OUTPATIENT)
Dept: HEALTH INFORMATION MANAGEMENT | Facility: OTHER | Age: 77
End: 2017-11-20

## 2017-11-20 ENCOUNTER — PATIENT OUTREACH (OUTPATIENT)
Dept: HEALTH INFORMATION MANAGEMENT | Facility: OTHER | Age: 77
End: 2017-11-20

## 2017-11-20 ENCOUNTER — HOME CARE VISIT (OUTPATIENT)
Dept: HOME HEALTH SERVICES | Facility: HOME HEALTHCARE | Age: 77
End: 2017-11-20
Payer: MEDICARE

## 2017-11-20 PROCEDURE — 665998 HH PPS REVENUE CREDIT

## 2017-11-20 PROCEDURE — 665999 HH PPS REVENUE DEBIT

## 2017-11-20 PROCEDURE — G0162 HHC RN E&M PLAN SVS, 15 MIN: HCPCS

## 2017-11-20 NOTE — PROGRESS NOTES
Referral from [facility]. Med rec completed. Discrepancy with lisinopril/HCTZ at discharge. D/C'd due to KELSI, KELSI resolved with IV fluids and medications resumed on 11/10 per Epic note by Cabrera Martinez. Medication not ordered at discharge. Called patient to clarify. Spoke with daughter, Lilia, who clarified with Dr. Mary that he would not like patient to take medication at this time.

## 2017-11-21 ENCOUNTER — HOME CARE VISIT (OUTPATIENT)
Dept: HOME HEALTH SERVICES | Facility: HOME HEALTHCARE | Age: 77
End: 2017-11-21
Payer: MEDICARE

## 2017-11-21 VITALS
TEMPERATURE: 99.3 F | RESPIRATION RATE: 18 BRPM | DIASTOLIC BLOOD PRESSURE: 50 MMHG | OXYGEN SATURATION: 96 % | SYSTOLIC BLOOD PRESSURE: 110 MMHG | HEART RATE: 76 BPM

## 2017-11-21 VITALS
HEART RATE: 96 BPM | RESPIRATION RATE: 20 BRPM | TEMPERATURE: 97.4 F | DIASTOLIC BLOOD PRESSURE: 60 MMHG | OXYGEN SATURATION: 94 % | SYSTOLIC BLOOD PRESSURE: 126 MMHG

## 2017-11-21 PROCEDURE — G0151 HHCP-SERV OF PT,EA 15 MIN: HCPCS

## 2017-11-21 PROCEDURE — 665999 HH PPS REVENUE DEBIT

## 2017-11-21 PROCEDURE — 665998 HH PPS REVENUE CREDIT

## 2017-11-21 SDOH — ECONOMIC STABILITY: HOUSING INSECURITY: HOME SAFETY: GROUP HOME PROVIDES EQUIPMENT, SAFETY AND SUP

## 2017-11-21 ASSESSMENT — ENCOUNTER SYMPTOMS
POOR JUDGMENT: 1
DIFFICULTY THINKING: 1

## 2017-11-21 NOTE — TELEPHONE ENCOUNTER
Maximilian Jarquin,    Med rec completed. Discrepancy with lisinopril/HCTZ at discharge. D/C'd due to KELSI, KELSI resolved with IV fluids and medications resumed on 11/10 per Epic note by Cabrera Martinez. Medication not ordered at discharge. Called patient to clarify. Spoke with daughter, Lilia, who clarified with Dr. Mary that he would not like patient to take medication at this time.      Thank you,  Bubba

## 2017-11-22 ENCOUNTER — HOME CARE VISIT (OUTPATIENT)
Dept: HOME HEALTH SERVICES | Facility: HOME HEALTHCARE | Age: 77
End: 2017-11-22
Payer: MEDICARE

## 2017-11-22 PROCEDURE — 665999 HH PPS REVENUE DEBIT

## 2017-11-22 PROCEDURE — 665998 HH PPS REVENUE CREDIT

## 2017-11-23 PROCEDURE — 665998 HH PPS REVENUE CREDIT

## 2017-11-23 PROCEDURE — 665999 HH PPS REVENUE DEBIT

## 2017-11-24 PROCEDURE — 665999 HH PPS REVENUE DEBIT

## 2017-11-24 PROCEDURE — 665998 HH PPS REVENUE CREDIT

## 2017-11-25 ENCOUNTER — HOME CARE VISIT (OUTPATIENT)
Dept: HOME HEALTH SERVICES | Facility: HOME HEALTHCARE | Age: 77
End: 2017-11-25
Payer: MEDICARE

## 2017-11-25 PROCEDURE — 665998 HH PPS REVENUE CREDIT

## 2017-11-25 PROCEDURE — 665999 HH PPS REVENUE DEBIT

## 2017-11-26 PROCEDURE — 665998 HH PPS REVENUE CREDIT

## 2017-11-26 PROCEDURE — 665999 HH PPS REVENUE DEBIT

## 2017-11-27 ENCOUNTER — HOME CARE VISIT (OUTPATIENT)
Dept: HOME HEALTH SERVICES | Facility: HOME HEALTHCARE | Age: 77
End: 2017-11-27
Payer: MEDICARE

## 2017-11-27 VITALS
WEIGHT: 133 LBS | TEMPERATURE: 97.9 F | HEART RATE: 72 BPM | SYSTOLIC BLOOD PRESSURE: 122 MMHG | BODY MASS INDEX: 20.83 KG/M2 | OXYGEN SATURATION: 97 % | DIASTOLIC BLOOD PRESSURE: 60 MMHG | RESPIRATION RATE: 18 BRPM

## 2017-11-27 PROCEDURE — 665999 HH PPS REVENUE DEBIT

## 2017-11-27 PROCEDURE — G0299 HHS/HOSPICE OF RN EA 15 MIN: HCPCS

## 2017-11-27 PROCEDURE — 665998 HH PPS REVENUE CREDIT

## 2017-11-27 SDOH — ECONOMIC STABILITY: HOUSING INSECURITY: UNSAFE COOKING RANGE AREA: 0

## 2017-11-27 SDOH — ECONOMIC STABILITY: HOUSING INSECURITY: UNSAFE APPLIANCES: 0

## 2017-11-27 ASSESSMENT — ACTIVITIES OF DAILY LIVING (ADL)
GROOMING_ASSISTANCE: 6
LAUNDRY_ASSISTANCE: 6
MEAL_PREP_ASSISTANCE: 6
EATING_ASSISTANCE: 0
TELEPHONE_ASSISTANCE: 6
SHOPPING_ASSISTANCE: 6
DRESSING_LB_ASSISTANCE: 6
BATHING_ASSISTANCE: 6
TRANSPORTATION_ASSISTANCE: 6
ORAL_CARE_ASSISTANCE: 6
TOILETING_ASSISTANCE: 6
DRESSING_UB_ASSISTANCE: 6
HOUSEKEEPING_ASSISTANCE: 6

## 2017-11-27 ASSESSMENT — ENCOUNTER SYMPTOMS
DIFFICULTY THINKING: 1
RESPIRATORY SYMPTOMS COMMENTS: RESPIRATIONS ARE EVEN AND UNLABORED WITH NO SOB NOTED

## 2017-11-28 ENCOUNTER — HOME CARE VISIT (OUTPATIENT)
Dept: HOME HEALTH SERVICES | Facility: HOME HEALTHCARE | Age: 77
End: 2017-11-28
Payer: MEDICARE

## 2017-11-28 VITALS
HEART RATE: 76 BPM | TEMPERATURE: 98.1 F | OXYGEN SATURATION: 97 % | DIASTOLIC BLOOD PRESSURE: 52 MMHG | RESPIRATION RATE: 16 BRPM | SYSTOLIC BLOOD PRESSURE: 126 MMHG

## 2017-11-28 PROCEDURE — G0151 HHCP-SERV OF PT,EA 15 MIN: HCPCS

## 2017-11-28 PROCEDURE — 665999 HH PPS REVENUE DEBIT

## 2017-11-28 PROCEDURE — 665998 HH PPS REVENUE CREDIT

## 2017-11-29 ENCOUNTER — HOME CARE VISIT (OUTPATIENT)
Dept: HOME HEALTH SERVICES | Facility: HOME HEALTHCARE | Age: 77
End: 2017-11-29
Payer: MEDICARE

## 2017-11-29 PROCEDURE — G0152 HHCP-SERV OF OT,EA 15 MIN: HCPCS

## 2017-11-29 PROCEDURE — 665999 HH PPS REVENUE DEBIT

## 2017-11-29 PROCEDURE — 665998 HH PPS REVENUE CREDIT

## 2017-11-30 ENCOUNTER — HOME CARE VISIT (OUTPATIENT)
Dept: HOME HEALTH SERVICES | Facility: HOME HEALTHCARE | Age: 77
End: 2017-11-30
Payer: MEDICARE

## 2017-11-30 ENCOUNTER — TELEPHONE (OUTPATIENT)
Dept: MEDICAL GROUP | Facility: PHYSICIAN GROUP | Age: 77
End: 2017-11-30

## 2017-11-30 VITALS
DIASTOLIC BLOOD PRESSURE: 56 MMHG | OXYGEN SATURATION: 100 % | RESPIRATION RATE: 16 BRPM | HEART RATE: 76 BPM | TEMPERATURE: 98.4 F | SYSTOLIC BLOOD PRESSURE: 128 MMHG

## 2017-11-30 VITALS
OXYGEN SATURATION: 99 % | RESPIRATION RATE: 18 BRPM | TEMPERATURE: 99.1 F | HEART RATE: 78 BPM | DIASTOLIC BLOOD PRESSURE: 65 MMHG | SYSTOLIC BLOOD PRESSURE: 120 MMHG

## 2017-11-30 PROCEDURE — 665998 HH PPS REVENUE CREDIT

## 2017-11-30 PROCEDURE — G0151 HHCP-SERV OF PT,EA 15 MIN: HCPCS

## 2017-11-30 PROCEDURE — 665999 HH PPS REVENUE DEBIT

## 2017-11-30 PROCEDURE — G0299 HHS/HOSPICE OF RN EA 15 MIN: HCPCS

## 2017-11-30 ASSESSMENT — ACTIVITIES OF DAILY LIVING (ADL)
DRESSING_LB_ASSISTANCE: 5
TELEPHONE_ASSISTANCE: 6
SHOPPING_ASSISTANCE: 6
TRANSPORTATION_ASSISTANCE: 6
HOUSEKEEPING_ASSISTANCE: 6
ORAL_CARE_ASSISTANCE: 4
LAUNDRY_ASSISTANCE: 6
EATING_ASSISTANCE: 0
GROOMING_ASSISTANCE: 4
DRESSING_UB_ASSISTANCE: 5
MEAL_PREP_ASSISTANCE: 6
BATHING_ASSISTANCE: 6
TOILETING_ASSISTANCE: 0

## 2017-11-30 ASSESSMENT — ENCOUNTER SYMPTOMS
DIFFICULTY THINKING: 1
DIFFICULTY THINKING: 1
POOR JUDGMENT: 1

## 2017-11-30 NOTE — TELEPHONE ENCOUNTER
1. Caller Name: [facility]                                         Call Back Number: [phone]      Patient approves a detailed voicemail message: N\A    Received fax from pharmacy stating Senna was a discharge order and they are asking if you want to continue.  Pt lives in a group home so they need a dc order or approve refill.

## 2017-12-01 VITALS
RESPIRATION RATE: 18 BRPM | OXYGEN SATURATION: 99 % | DIASTOLIC BLOOD PRESSURE: 81 MMHG | HEART RATE: 69 BPM | TEMPERATURE: 97.8 F | SYSTOLIC BLOOD PRESSURE: 108 MMHG

## 2017-12-01 PROCEDURE — 665999 HH PPS REVENUE DEBIT

## 2017-12-01 PROCEDURE — 665998 HH PPS REVENUE CREDIT

## 2017-12-01 SDOH — ECONOMIC STABILITY: HOUSING INSECURITY: UNSAFE COOKING RANGE AREA: 0

## 2017-12-01 SDOH — ECONOMIC STABILITY: HOUSING INSECURITY: UNSAFE APPLIANCES: 0

## 2017-12-01 ASSESSMENT — ACTIVITIES OF DAILY LIVING (ADL)
TRANSPORTATION_ASSISTANCE: 6
LAUNDRY_ASSISTANCE: 6
DRESSING_UB_ASSISTANCE: 5
ORAL_CARE_ASSISTANCE: 4
GROOMING_ASSISTANCE: 4
SHOPPING_ASSISTANCE: 6
TOILETING_ASSISTANCE: 4
HOUSEKEEPING_ASSISTANCE: 6
MEAL_PREP_ASSISTANCE: 6
EATING_ASSISTANCE: 0
DRESSING_LB_ASSISTANCE: 5
TELEPHONE_ASSISTANCE: 6
BATHING_ASSISTANCE: 6

## 2017-12-01 ASSESSMENT — ENCOUNTER SYMPTOMS
VOMITING: DENIES
NAUSEA: DENIES

## 2017-12-01 NOTE — TELEPHONE ENCOUNTER
That can be to the discretion of the home health nurse. I haven't seen this patient since hospital discharge so pharmacy may want to check with [facility] home health RN and if she feels that patient requires it then I can refill.

## 2017-12-02 PROCEDURE — 665998 HH PPS REVENUE CREDIT

## 2017-12-02 PROCEDURE — 665999 HH PPS REVENUE DEBIT

## 2017-12-03 PROCEDURE — 665999 HH PPS REVENUE DEBIT

## 2017-12-03 PROCEDURE — 665998 HH PPS REVENUE CREDIT

## 2017-12-04 ENCOUNTER — HOME CARE VISIT (OUTPATIENT)
Dept: HOME HEALTH SERVICES | Facility: HOME HEALTHCARE | Age: 77
End: 2017-12-04
Payer: MEDICARE

## 2017-12-04 VITALS
DIASTOLIC BLOOD PRESSURE: 58 MMHG | HEART RATE: 65 BPM | RESPIRATION RATE: 18 BRPM | SYSTOLIC BLOOD PRESSURE: 120 MMHG | TEMPERATURE: 97.6 F | OXYGEN SATURATION: 99 %

## 2017-12-04 PROCEDURE — 665999 HH PPS REVENUE DEBIT

## 2017-12-04 PROCEDURE — G0162 HHC RN E&M PLAN SVS, 15 MIN: HCPCS

## 2017-12-04 PROCEDURE — 665998 HH PPS REVENUE CREDIT

## 2017-12-04 SDOH — ECONOMIC STABILITY: HOUSING INSECURITY: UNSAFE COOKING RANGE AREA: 0

## 2017-12-04 SDOH — ECONOMIC STABILITY: HOUSING INSECURITY: UNSAFE APPLIANCES: 0

## 2017-12-04 ASSESSMENT — ACTIVITIES OF DAILY LIVING (ADL)
TOILETING_ASSISTANCE: 6
DRESSING_UB_ASSISTANCE: 5
GROOMING_ASSISTANCE: 5
ORAL_CARE_ASSISTANCE: 5
TELEPHONE_ASSISTANCE: 6
EATING_ASSISTANCE: 0
DRESSING_LB_ASSISTANCE: 5
MEAL_PREP_ASSISTANCE: 6
BATHING_ASSISTANCE: 5
LAUNDRY_ASSISTANCE: 6
SHOPPING_ASSISTANCE: 6
HOUSEKEEPING_ASSISTANCE: 6
TRANSPORTATION_ASSISTANCE: 6

## 2017-12-04 ASSESSMENT — ENCOUNTER SYMPTOMS
VOMITING: DENIES
DIFFICULTY THINKING: 1
NAUSEA: DENIES
RESPIRATORY SYMPTOMS COMMENTS: RESPIRATIONS ARE EVEN AND UNLABORED WITH NO SOB NOTED

## 2017-12-05 PROCEDURE — 665998 HH PPS REVENUE CREDIT

## 2017-12-05 PROCEDURE — 665999 HH PPS REVENUE DEBIT

## 2017-12-06 PROCEDURE — G0180 MD CERTIFICATION HHA PATIENT: HCPCS | Performed by: INTERNAL MEDICINE

## 2017-12-06 PROCEDURE — 665998 HH PPS REVENUE CREDIT

## 2017-12-06 PROCEDURE — 665999 HH PPS REVENUE DEBIT

## 2017-12-07 ENCOUNTER — HOME CARE VISIT (OUTPATIENT)
Dept: HOME HEALTH SERVICES | Facility: HOME HEALTHCARE | Age: 77
End: 2017-12-07
Payer: MEDICARE

## 2017-12-07 VITALS
DIASTOLIC BLOOD PRESSURE: 64 MMHG | TEMPERATURE: 97.4 F | RESPIRATION RATE: 18 BRPM | OXYGEN SATURATION: 93 % | HEART RATE: 84 BPM | SYSTOLIC BLOOD PRESSURE: 112 MMHG

## 2017-12-07 VITALS
DIASTOLIC BLOOD PRESSURE: 64 MMHG | OXYGEN SATURATION: 93 % | TEMPERATURE: 97.4 F | SYSTOLIC BLOOD PRESSURE: 112 MMHG | HEART RATE: 84 BPM | RESPIRATION RATE: 16 BRPM

## 2017-12-07 PROCEDURE — G0151 HHCP-SERV OF PT,EA 15 MIN: HCPCS

## 2017-12-07 PROCEDURE — G0299 HHS/HOSPICE OF RN EA 15 MIN: HCPCS

## 2017-12-07 PROCEDURE — 665998 HH PPS REVENUE CREDIT

## 2017-12-07 PROCEDURE — 665999 HH PPS REVENUE DEBIT

## 2017-12-07 SDOH — ECONOMIC STABILITY: HOUSING INSECURITY: UNSAFE COOKING RANGE AREA: 0

## 2017-12-07 SDOH — ECONOMIC STABILITY: HOUSING INSECURITY: UNSAFE APPLIANCES: 0

## 2017-12-07 ASSESSMENT — ACTIVITIES OF DAILY LIVING (ADL)
DRESSING_LB_ASSISTANCE: 6
EATING_ASSISTANCE: 6
LAUNDRY_ASSISTANCE: 6
BATHING_ASSISTANCE: 6
TRANSPORTATION_ASSISTANCE: 6
GROOMING_ASSISTANCE: 6
SHOPPING_ASSISTANCE: 6
TOILETING_ASSISTANCE: 6
MEAL_PREP_ASSISTANCE: 6
ORAL_CARE_ASSISTANCE: 6
DRESSING_UB_ASSISTANCE: 6
HOUSEKEEPING_ASSISTANCE: 6
TELEPHONE_ASSISTANCE: 6

## 2017-12-07 ASSESSMENT — ENCOUNTER SYMPTOMS
NAUSEA: DENIES
DIFFICULTY THINKING: 1
VOMITING: DENIES
DIFFICULTY THINKING: 1

## 2017-12-08 PROCEDURE — 665999 HH PPS REVENUE DEBIT

## 2017-12-08 PROCEDURE — 665998 HH PPS REVENUE CREDIT

## 2017-12-09 PROCEDURE — 665999 HH PPS REVENUE DEBIT

## 2017-12-09 PROCEDURE — 665998 HH PPS REVENUE CREDIT

## 2017-12-10 PROCEDURE — 665998 HH PPS REVENUE CREDIT

## 2017-12-10 PROCEDURE — 665999 HH PPS REVENUE DEBIT

## 2017-12-11 ENCOUNTER — HOME CARE VISIT (OUTPATIENT)
Dept: HOME HEALTH SERVICES | Facility: HOME HEALTHCARE | Age: 77
End: 2017-12-11
Payer: MEDICARE

## 2017-12-11 VITALS
OXYGEN SATURATION: 98 % | SYSTOLIC BLOOD PRESSURE: 118 MMHG | HEART RATE: 73 BPM | TEMPERATURE: 98 F | RESPIRATION RATE: 16 BRPM | DIASTOLIC BLOOD PRESSURE: 60 MMHG

## 2017-12-11 PROCEDURE — 665998 HH PPS REVENUE CREDIT

## 2017-12-11 PROCEDURE — 665999 HH PPS REVENUE DEBIT

## 2017-12-11 PROCEDURE — G0162 HHC RN E&M PLAN SVS, 15 MIN: HCPCS

## 2017-12-11 SDOH — ECONOMIC STABILITY: HOUSING INSECURITY: UNSAFE APPLIANCES: 0

## 2017-12-11 SDOH — ECONOMIC STABILITY: HOUSING INSECURITY: UNSAFE COOKING RANGE AREA: 0

## 2017-12-11 ASSESSMENT — ACTIVITIES OF DAILY LIVING (ADL)
OASIS_M1830: 03
HOME_HEALTH_OASIS: 01

## 2017-12-11 ASSESSMENT — ENCOUNTER SYMPTOMS: POOR JUDGMENT: 1

## 2017-12-12 PROCEDURE — 665998 HH PPS REVENUE CREDIT

## 2017-12-12 PROCEDURE — 665999 HH PPS REVENUE DEBIT

## 2017-12-13 ENCOUNTER — PATIENT OUTREACH (OUTPATIENT)
Dept: HEALTH INFORMATION MANAGEMENT | Facility: OTHER | Age: 77
End: 2017-12-13

## 2017-12-13 PROCEDURE — 665998 HH PPS REVENUE CREDIT

## 2017-12-13 PROCEDURE — 665999 HH PPS REVENUE DEBIT

## 2017-12-13 NOTE — TELEPHONE ENCOUNTER
I have sent an encounter and called [facility] and have not gotten a response.  Pharmacy keeps faxing that they need a refill or discontinue order.

## 2017-12-14 PROCEDURE — 665999 HH PPS REVENUE DEBIT

## 2017-12-14 PROCEDURE — 665998 HH PPS REVENUE CREDIT

## 2017-12-15 PROCEDURE — 665999 HH PPS REVENUE DEBIT

## 2017-12-15 PROCEDURE — 665998 HH PPS REVENUE CREDIT

## 2017-12-16 PROCEDURE — 665999 HH PPS REVENUE DEBIT

## 2017-12-16 PROCEDURE — 665998 HH PPS REVENUE CREDIT

## 2017-12-17 PROCEDURE — 665998 HH PPS REVENUE CREDIT

## 2017-12-17 PROCEDURE — 665999 HH PPS REVENUE DEBIT

## 2017-12-18 PROCEDURE — 665998 HH PPS REVENUE CREDIT

## 2017-12-18 PROCEDURE — 665999 HH PPS REVENUE DEBIT

## 2017-12-19 PROCEDURE — 665998 HH PPS REVENUE CREDIT

## 2017-12-19 PROCEDURE — 665999 HH PPS REVENUE DEBIT

## 2017-12-20 PROCEDURE — 665999 HH PPS REVENUE DEBIT

## 2017-12-20 PROCEDURE — 665998 HH PPS REVENUE CREDIT

## 2017-12-21 PROCEDURE — 665999 HH PPS REVENUE DEBIT

## 2017-12-21 PROCEDURE — 665998 HH PPS REVENUE CREDIT

## 2017-12-22 PROCEDURE — 665999 HH PPS REVENUE DEBIT

## 2017-12-22 PROCEDURE — 665998 HH PPS REVENUE CREDIT

## 2017-12-23 PROCEDURE — 665998 HH PPS REVENUE CREDIT

## 2017-12-23 PROCEDURE — 665999 HH PPS REVENUE DEBIT

## 2017-12-24 PROCEDURE — 665999 HH PPS REVENUE DEBIT

## 2017-12-24 PROCEDURE — 665998 HH PPS REVENUE CREDIT

## 2017-12-25 PROCEDURE — 665999 HH PPS REVENUE DEBIT

## 2017-12-25 PROCEDURE — 665998 HH PPS REVENUE CREDIT

## 2017-12-26 PROCEDURE — 665998 HH PPS REVENUE CREDIT

## 2017-12-26 PROCEDURE — 665999 HH PPS REVENUE DEBIT

## 2017-12-27 DIAGNOSIS — K21.9 GASTROESOPHAGEAL REFLUX DISEASE WITHOUT ESOPHAGITIS: ICD-10-CM

## 2017-12-27 DIAGNOSIS — F03.90 DEMENTIA WITHOUT BEHAVIORAL DISTURBANCE, UNSPECIFIED DEMENTIA TYPE: ICD-10-CM

## 2017-12-27 DIAGNOSIS — E78.2 MIXED HYPERLIPIDEMIA: ICD-10-CM

## 2017-12-28 RX ORDER — AMLODIPINE BESYLATE 10 MG/1
10 TABLET ORAL DAILY
Qty: 90 TAB | Refills: 1 | Status: ON HOLD | OUTPATIENT
Start: 2017-12-28 | End: 2018-04-02

## 2017-12-28 RX ORDER — OMEPRAZOLE 20 MG/1
CAPSULE, DELAYED RELEASE ORAL
Qty: 90 CAP | Refills: 1 | Status: SHIPPED | OUTPATIENT
Start: 2017-12-28 | End: 2018-07-27 | Stop reason: SDUPTHER

## 2017-12-28 RX ORDER — ATORVASTATIN CALCIUM 20 MG/1
TABLET, FILM COATED ORAL
Qty: 90 TAB | Refills: 1 | Status: SHIPPED | OUTPATIENT
Start: 2017-12-28 | End: 2018-07-27 | Stop reason: SDUPTHER

## 2017-12-28 RX ORDER — DONEPEZIL HYDROCHLORIDE 23 MG/1
TABLET, FILM COATED ORAL
Qty: 90 TAB | Refills: 1 | Status: SHIPPED | OUTPATIENT
Start: 2017-12-28 | End: 2018-07-27 | Stop reason: SDUPTHER

## 2017-12-28 RX ORDER — MEMANTINE HYDROCHLORIDE 14 MG/1
CAPSULE, EXTENDED RELEASE ORAL
Qty: 90 CAP | Refills: 1 | Status: SHIPPED | OUTPATIENT
Start: 2017-12-28 | End: 2018-06-27 | Stop reason: SDUPTHER

## 2017-12-28 NOTE — TELEPHONE ENCOUNTER
Refill X 6 months, sent to pharmacy.Pt. Seen in the last 6 months per protocol.   Lab Results   Component Value Date/Time    SODIUM 136 11/13/2017 03:09 AM    POTASSIUM 4.0 11/13/2017 03:09 AM    CHLORIDE 106 11/13/2017 03:09 AM    CO2 22 11/13/2017 03:09 AM    GLUCOSE 111 (H) 11/13/2017 03:09 AM    BUN 20 11/13/2017 03:09 AM    CREATININE 1.04 11/13/2017 03:09 AM

## 2017-12-28 NOTE — TELEPHONE ENCOUNTER
Was the patient seen in the last year in this department? Yes     Does patient have an active prescription for medications requested? No     Received Request Via: Patient      Pt met protocol?: Yes, OV 11/17   Lab Results   Component Value Date/Time    CHOLSTRLTOT 151 07/21/2017 09:00 AM    LDL 79 07/21/2017 09:00 AM    HDL 30 (A) 07/21/2017 09:00 AM    TRIGLYCERIDE 209 (H) 07/21/2017 09:00 AM       Lab Results   Component Value Date/Time    SODIUM 136 11/13/2017 03:09 AM    POTASSIUM 4.0 11/13/2017 03:09 AM    CHLORIDE 106 11/13/2017 03:09 AM    CO2 22 11/13/2017 03:09 AM    GLUCOSE 111 (H) 11/13/2017 03:09 AM    BUN 20 11/13/2017 03:09 AM    CREATININE 1.04 11/13/2017 03:09 AM     Lab Results   Component Value Date/Time    ALKPHOSPHAT 85 11/07/2017 01:48 PM    ASTSGOT 12 11/07/2017 01:48 PM    ALTSGPT 11 11/07/2017 01:48 PM    TBILIRUBIN 0.4 11/07/2017 01:48 PM

## 2017-12-28 NOTE — TELEPHONE ENCOUNTER
See MA's notes below, pt has met protocol, OV 11/2017  BP Readings from Last 1 Encounters:   12/11/17 118/60

## 2017-12-28 NOTE — PROGRESS NOTES
Nena Lamb was admitted to [facility] on 11/7/17 for a UTI. Patient was discharged home on 11/13/17. Patient lives at a 24/hr group home, and receives additional support from her daughter. Patient has a diagnoses of dementia, so [facility] patient  advocate spoke only with patient's daughter, . Patient was discharged with [facility] which started on 11/17/17. On 12/11/17,  called patient advocate stating that she was concerned patient had another UTI due to changes in patient's behavior. Patient advocate got in touch with [facility], who was able  to have patient's nurse check patient for a UTI. Per discharge orders, patient was instructed to see her PCP. Patient's daughter cancelled patient's PCP for 12/7/17 and has not rescheduled, despite encouragement by patient advocate.

## 2017-12-28 NOTE — TELEPHONE ENCOUNTER
Pt last seen regarding this issue 11/17. Will send 6 months of fills to pharmacy.    Lab Results   Component Value Date/Time    CHOLSTRLTOT 151 07/21/2017 09:00 AM    LDL 79 07/21/2017 09:00 AM    HDL 30 (A) 07/21/2017 09:00 AM    TRIGLYCERIDE 209 (H) 07/21/2017 09:00 AM       Lab Results   Component Value Date/Time    SODIUM 136 11/13/2017 03:09 AM    POTASSIUM 4.0 11/13/2017 03:09 AM    CHLORIDE 106 11/13/2017 03:09 AM    CO2 22 11/13/2017 03:09 AM    GLUCOSE 111 (H) 11/13/2017 03:09 AM    BUN 20 11/13/2017 03:09 AM    CREATININE 1.04 11/13/2017 03:09 AM     Lab Results   Component Value Date/Time    ALKPHOSPHAT 85 11/07/2017 01:48 PM    ASTSGOT 12 11/07/2017 01:48 PM    ALTSGPT 11 11/07/2017 01:48 PM    TBILIRUBIN 0.4 11/07/2017 01:48 PM

## 2018-01-12 ENCOUNTER — TELEPHONE (OUTPATIENT)
Dept: MEDICAL GROUP | Facility: PHYSICIAN GROUP | Age: 78
End: 2018-01-12

## 2018-01-12 DIAGNOSIS — N30.00 ACUTE CYSTITIS WITHOUT HEMATURIA: ICD-10-CM

## 2018-01-12 RX ORDER — NITROFURANTOIN MACROCRYSTALS 100 MG/1
100 CAPSULE ORAL 2 TIMES DAILY
Qty: 14 CAP | Refills: 0 | Status: SHIPPED | OUTPATIENT
Start: 2018-01-12 | End: 2018-03-29

## 2018-01-12 NOTE — TELEPHONE ENCOUNTER
Called  to let her know and was informed that pt is being admitted to hospice.  Cancelled upcoming appointment

## 2018-01-12 NOTE — TELEPHONE ENCOUNTER
IF patient is having symptoms of UTI I have sent a prescription of antibiotics to her pharmacy and ordered a UA (she needs to provide urine sample before starting antibiotics). If her pain worsens or she starts to have fevers or chills then she needs to be seen in urgent care or the emergency room.

## 2018-01-12 NOTE — TELEPHONE ENCOUNTER
1. Caller Name: /dtr                                         Call Back Number: [phone]      Patient approves a detailed voicemail message: N\A    Pt has appointment 01/16/18 for UTI check but dtr states her low back pin is getting worse.  She is asking if she can bring in a sample to get it tested and get her started on abx.  They will keep appointment if needed.

## 2018-01-26 RX ORDER — POLYETHYLENE GLYCOL 3350 17 G/17G
17 POWDER, FOR SOLUTION ORAL DAILY
Qty: 527 EACH | Refills: 0 | Status: SHIPPED | OUTPATIENT
Start: 2018-01-26 | End: 2018-02-26 | Stop reason: SDUPTHER

## 2018-01-29 DIAGNOSIS — E86.0 DEHYDRATION WITH HYPONATREMIA: ICD-10-CM

## 2018-01-29 DIAGNOSIS — E87.1 DEHYDRATION WITH HYPONATREMIA: ICD-10-CM

## 2018-01-31 RX ORDER — POTASSIUM CHLORIDE 20 MEQ/1
TABLET, EXTENDED RELEASE ORAL
Qty: 180 TAB | Refills: 1 | Status: SHIPPED | OUTPATIENT
Start: 2018-01-31 | End: 2018-06-27 | Stop reason: SDUPTHER

## 2018-01-31 NOTE — TELEPHONE ENCOUNTER
Was the patient seen in the last year in this department? Yes     Does patient have an active prescription for medications requested? No     Received Request Via: Pharmacy      Pt met protocol?: Yes, OV 11/17   Lab Results   Component Value Date/Time    SODIUM 136 11/13/2017 03:09 AM    POTASSIUM 4.0 11/13/2017 03:09 AM    CHLORIDE 106 11/13/2017 03:09 AM    CO2 22 11/13/2017 03:09 AM    GLUCOSE 111 (H) 11/13/2017 03:09 AM    BUN 20 11/13/2017 03:09 AM    CREATININE 1.04 11/13/2017 03:09 AM

## 2018-02-27 RX ORDER — POLYETHYLENE GLYCOL 3350 17 G/17G
POWDER, FOR SOLUTION ORAL
Qty: 30 PACKET | Refills: 1 | Status: SHIPPED | OUTPATIENT
Start: 2018-02-27

## 2018-02-27 NOTE — TELEPHONE ENCOUNTER
Was the patient seen in the last year in this department? Yes     Does patient have an active prescription for medications requested? No     Received Request Via: Pharmacy    Pt met protocol?: Yes     Last OV 11/2017

## 2018-03-29 ENCOUNTER — APPOINTMENT (OUTPATIENT)
Dept: RADIOLOGY | Facility: MEDICAL CENTER | Age: 78
DRG: 470 | End: 2018-03-29
Attending: EMERGENCY MEDICINE
Payer: MEDICARE

## 2018-03-29 ENCOUNTER — RESOLUTE PROFESSIONAL BILLING HOSPITAL PROF FEE (OUTPATIENT)
Dept: HOSPITALIST | Facility: MEDICAL CENTER | Age: 78
End: 2018-03-29
Payer: MEDICARE

## 2018-03-29 ENCOUNTER — HOSPITAL ENCOUNTER (INPATIENT)
Facility: MEDICAL CENTER | Age: 78
LOS: 5 days | DRG: 470 | End: 2018-04-03
Attending: EMERGENCY MEDICINE | Admitting: INTERNAL MEDICINE
Payer: MEDICARE

## 2018-03-29 DIAGNOSIS — F03.C0 ADVANCED DEMENTIA (HCC): ICD-10-CM

## 2018-03-29 DIAGNOSIS — S72.002A CLOSED FRACTURE OF LEFT HIP, INITIAL ENCOUNTER (HCC): ICD-10-CM

## 2018-03-29 DIAGNOSIS — Z51.5 HOSPICE CARE: ICD-10-CM

## 2018-03-29 LAB
ANION GAP SERPL CALC-SCNC: 11 MMOL/L (ref 0–11.9)
APPEARANCE UR: ABNORMAL
APTT PPP: 28.1 SEC (ref 24.7–36)
BACTERIA #/AREA URNS HPF: ABNORMAL /HPF
BASOPHILS # BLD AUTO: 0.2 % (ref 0–1.8)
BASOPHILS # BLD: 0.03 K/UL (ref 0–0.12)
BILIRUB UR QL STRIP.AUTO: NEGATIVE
BUN SERPL-MCNC: 26 MG/DL (ref 8–22)
CALCIUM SERPL-MCNC: 9.8 MG/DL (ref 8.5–10.5)
CHLORIDE SERPL-SCNC: 103 MMOL/L (ref 96–112)
CO2 SERPL-SCNC: 22 MMOL/L (ref 20–33)
COLOR UR: YELLOW
CREAT SERPL-MCNC: 0.97 MG/DL (ref 0.5–1.4)
CULTURE IF INDICATED INDCX: YES UA CULTURE
EOSINOPHIL # BLD AUTO: 0.07 K/UL (ref 0–0.51)
EOSINOPHIL NFR BLD: 0.6 % (ref 0–6.9)
EPI CELLS #/AREA URNS HPF: NEGATIVE /HPF
ERYTHROCYTE [DISTWIDTH] IN BLOOD BY AUTOMATED COUNT: 46.1 FL (ref 35.9–50)
GLUCOSE SERPL-MCNC: 165 MG/DL (ref 65–99)
GLUCOSE UR STRIP.AUTO-MCNC: NEGATIVE MG/DL
HCT VFR BLD AUTO: 37.7 % (ref 37–47)
HGB BLD-MCNC: 12.2 G/DL (ref 12–16)
HYALINE CASTS #/AREA URNS LPF: ABNORMAL /LPF
IMM GRANULOCYTES # BLD AUTO: 0.09 K/UL (ref 0–0.11)
IMM GRANULOCYTES NFR BLD AUTO: 0.7 % (ref 0–0.9)
INR PPP: 1.04 (ref 0.87–1.13)
KETONES UR STRIP.AUTO-MCNC: ABNORMAL MG/DL
LEUKOCYTE ESTERASE UR QL STRIP.AUTO: ABNORMAL
LYMPHOCYTES # BLD AUTO: 1.42 K/UL (ref 1–4.8)
LYMPHOCYTES NFR BLD: 11.5 % (ref 22–41)
MCH RBC QN AUTO: 27.7 PG (ref 27–33)
MCHC RBC AUTO-ENTMCNC: 32.4 G/DL (ref 33.6–35)
MCV RBC AUTO: 85.7 FL (ref 81.4–97.8)
MICRO URNS: ABNORMAL
MONOCYTES # BLD AUTO: 0.54 K/UL (ref 0–0.85)
MONOCYTES NFR BLD AUTO: 4.4 % (ref 0–13.4)
NEUTROPHILS # BLD AUTO: 10.21 K/UL (ref 2–7.15)
NEUTROPHILS NFR BLD: 82.6 % (ref 44–72)
NITRITE UR QL STRIP.AUTO: NEGATIVE
NRBC # BLD AUTO: 0 K/UL
NRBC BLD-RTO: 0 /100 WBC
PH UR STRIP.AUTO: 7.5 [PH]
PLATELET # BLD AUTO: 348 K/UL (ref 164–446)
PMV BLD AUTO: 9.4 FL (ref 9–12.9)
POTASSIUM SERPL-SCNC: 4.1 MMOL/L (ref 3.6–5.5)
PROT UR QL STRIP: 30 MG/DL
PROTHROMBIN TIME: 13.3 SEC (ref 12–14.6)
RBC # BLD AUTO: 4.4 M/UL (ref 4.2–5.4)
RBC # URNS HPF: ABNORMAL /HPF
RBC UR QL AUTO: NEGATIVE
SODIUM SERPL-SCNC: 136 MMOL/L (ref 135–145)
SP GR UR STRIP.AUTO: 1.02
UROBILINOGEN UR STRIP.AUTO-MCNC: 0.2 MG/DL
WBC # BLD AUTO: 12.4 K/UL (ref 4.8–10.8)
WBC #/AREA URNS HPF: ABNORMAL /HPF

## 2018-03-29 PROCEDURE — 700111 HCHG RX REV CODE 636 W/ 250 OVERRIDE (IP): Performed by: EMERGENCY MEDICINE

## 2018-03-29 PROCEDURE — 73551 X-RAY EXAM OF FEMUR 1: CPT | Mod: LT

## 2018-03-29 PROCEDURE — 770006 HCHG ROOM/CARE - MED/SURG/GYN SEMI*

## 2018-03-29 PROCEDURE — 85610 PROTHROMBIN TIME: CPT

## 2018-03-29 PROCEDURE — 99223 1ST HOSP IP/OBS HIGH 75: CPT | Mod: AI,GW | Performed by: INTERNAL MEDICINE

## 2018-03-29 PROCEDURE — 87186 SC STD MICRODIL/AGAR DIL: CPT

## 2018-03-29 PROCEDURE — 87077 CULTURE AEROBIC IDENTIFY: CPT

## 2018-03-29 PROCEDURE — 700105 HCHG RX REV CODE 258: Performed by: EMERGENCY MEDICINE

## 2018-03-29 PROCEDURE — 700102 HCHG RX REV CODE 250 W/ 637 OVERRIDE(OP): Performed by: INTERNAL MEDICINE

## 2018-03-29 PROCEDURE — 85025 COMPLETE CBC W/AUTO DIFF WBC: CPT

## 2018-03-29 PROCEDURE — 87086 URINE CULTURE/COLONY COUNT: CPT

## 2018-03-29 PROCEDURE — 72170 X-RAY EXAM OF PELVIS: CPT

## 2018-03-29 PROCEDURE — 81001 URINALYSIS AUTO W/SCOPE: CPT

## 2018-03-29 PROCEDURE — A9270 NON-COVERED ITEM OR SERVICE: HCPCS | Performed by: INTERNAL MEDICINE

## 2018-03-29 PROCEDURE — 96374 THER/PROPH/DIAG INJ IV PUSH: CPT

## 2018-03-29 PROCEDURE — 700105 HCHG RX REV CODE 258: Performed by: INTERNAL MEDICINE

## 2018-03-29 PROCEDURE — 80048 BASIC METABOLIC PNL TOTAL CA: CPT

## 2018-03-29 PROCEDURE — 71045 X-RAY EXAM CHEST 1 VIEW: CPT

## 2018-03-29 PROCEDURE — 85730 THROMBOPLASTIN TIME PARTIAL: CPT

## 2018-03-29 PROCEDURE — 99285 EMERGENCY DEPT VISIT HI MDM: CPT

## 2018-03-29 RX ORDER — ATORVASTATIN CALCIUM 20 MG/1
20 TABLET, FILM COATED ORAL
Status: DISCONTINUED | OUTPATIENT
Start: 2018-03-29 | End: 2018-04-03 | Stop reason: HOSPADM

## 2018-03-29 RX ORDER — SODIUM CHLORIDE 9 MG/ML
INJECTION, SOLUTION INTRAVENOUS CONTINUOUS
Status: DISCONTINUED | OUTPATIENT
Start: 2018-03-29 | End: 2018-03-29

## 2018-03-29 RX ORDER — POLYETHYLENE GLYCOL 3350 17 G/17G
1 POWDER, FOR SOLUTION ORAL
Status: DISCONTINUED | OUTPATIENT
Start: 2018-03-29 | End: 2018-04-03 | Stop reason: HOSPADM

## 2018-03-29 RX ORDER — CHOLECALCIFEROL (VITAMIN D3) 125 MCG
1000 CAPSULE ORAL DAILY
Status: DISCONTINUED | OUTPATIENT
Start: 2018-03-30 | End: 2018-04-03 | Stop reason: HOSPADM

## 2018-03-29 RX ORDER — QUETIAPINE FUMARATE 25 MG/1
50 TABLET, FILM COATED ORAL EVERY EVENING
Status: DISCONTINUED | OUTPATIENT
Start: 2018-03-29 | End: 2018-04-03 | Stop reason: HOSPADM

## 2018-03-29 RX ORDER — MEMANTINE HYDROCHLORIDE 14 MG/1
1 CAPSULE, EXTENDED RELEASE ORAL DAILY
Status: DISCONTINUED | OUTPATIENT
Start: 2018-03-29 | End: 2018-03-29

## 2018-03-29 RX ORDER — CETIRIZINE HYDROCHLORIDE 10 MG/1
10 TABLET ORAL DAILY
Status: DISCONTINUED | OUTPATIENT
Start: 2018-03-30 | End: 2018-04-03 | Stop reason: HOSPADM

## 2018-03-29 RX ORDER — DONEPEZIL HYDROCHLORIDE 23 MG/1
23 TABLET, FILM COATED ORAL DAILY
Status: DISCONTINUED | OUTPATIENT
Start: 2018-03-30 | End: 2018-03-30

## 2018-03-29 RX ORDER — OXYCODONE HYDROCHLORIDE 5 MG/1
2.5 TABLET ORAL
Status: DISCONTINUED | OUTPATIENT
Start: 2018-03-29 | End: 2018-04-03 | Stop reason: HOSPADM

## 2018-03-29 RX ORDER — OMEPRAZOLE 20 MG/1
20 CAPSULE, DELAYED RELEASE ORAL DAILY
Status: DISCONTINUED | OUTPATIENT
Start: 2018-03-30 | End: 2018-04-03 | Stop reason: HOSPADM

## 2018-03-29 RX ORDER — MEMANTINE HYDROCHLORIDE 10 MG/1
5 TABLET ORAL 2 TIMES DAILY
Status: DISCONTINUED | OUTPATIENT
Start: 2018-03-29 | End: 2018-04-03 | Stop reason: HOSPADM

## 2018-03-29 RX ORDER — AMLODIPINE BESYLATE 10 MG/1
10 TABLET ORAL DAILY
Status: DISCONTINUED | OUTPATIENT
Start: 2018-03-30 | End: 2018-03-30

## 2018-03-29 RX ORDER — OXYCODONE HYDROCHLORIDE 5 MG/1
5 TABLET ORAL
Status: DISCONTINUED | OUTPATIENT
Start: 2018-03-29 | End: 2018-04-03 | Stop reason: HOSPADM

## 2018-03-29 RX ORDER — SODIUM CHLORIDE 9 MG/ML
INJECTION, SOLUTION INTRAVENOUS CONTINUOUS
Status: DISCONTINUED | OUTPATIENT
Start: 2018-03-29 | End: 2018-04-01

## 2018-03-29 RX ORDER — AMOXICILLIN 250 MG
2 CAPSULE ORAL 2 TIMES DAILY
Status: DISCONTINUED | OUTPATIENT
Start: 2018-03-29 | End: 2018-04-03 | Stop reason: HOSPADM

## 2018-03-29 RX ORDER — ACETAMINOPHEN 325 MG/1
650 TABLET ORAL EVERY 6 HOURS PRN
Status: DISCONTINUED | OUTPATIENT
Start: 2018-03-29 | End: 2018-04-03 | Stop reason: HOSPADM

## 2018-03-29 RX ORDER — CEFTRIAXONE 1 G/1
1 INJECTION, POWDER, FOR SOLUTION INTRAMUSCULAR; INTRAVENOUS ONCE
Status: COMPLETED | OUTPATIENT
Start: 2018-03-29 | End: 2018-03-29

## 2018-03-29 RX ORDER — POTASSIUM CHLORIDE 20 MEQ/1
20 TABLET, EXTENDED RELEASE ORAL DAILY
Status: DISCONTINUED | OUTPATIENT
Start: 2018-03-30 | End: 2018-04-03 | Stop reason: HOSPADM

## 2018-03-29 RX ORDER — BISACODYL 10 MG
10 SUPPOSITORY, RECTAL RECTAL
Status: DISCONTINUED | OUTPATIENT
Start: 2018-03-29 | End: 2018-04-03 | Stop reason: HOSPADM

## 2018-03-29 RX ORDER — AMOXICILLIN 250 MG
1 CAPSULE ORAL DAILY
COMMUNITY

## 2018-03-29 RX ADMIN — QUETIAPINE FUMARATE 50 MG: 25 TABLET ORAL at 21:46

## 2018-03-29 RX ADMIN — SODIUM CHLORIDE: 9 INJECTION, SOLUTION INTRAVENOUS at 19:15

## 2018-03-29 RX ADMIN — SODIUM CHLORIDE: 9 INJECTION, SOLUTION INTRAVENOUS at 16:44

## 2018-03-29 RX ADMIN — MEMANTINE 5 MG: 10 TABLET ORAL at 21:46

## 2018-03-29 RX ADMIN — ATORVASTATIN CALCIUM 20 MG: 20 TABLET, FILM COATED ORAL at 21:46

## 2018-03-29 RX ADMIN — STANDARDIZED SENNA CONCENTRATE AND DOCUSATE SODIUM 2 TABLET: 8.6; 5 TABLET, FILM COATED ORAL at 21:46

## 2018-03-29 RX ADMIN — CEFTRIAXONE SODIUM 1 G: 1 INJECTION, POWDER, FOR SOLUTION INTRAMUSCULAR; INTRAVENOUS at 17:46

## 2018-03-29 ASSESSMENT — PAIN SCALES - WONG BAKER: WONGBAKER_NUMERICALRESPONSE: HURTS JUST A LITTLE BIT

## 2018-03-29 NOTE — ED TRIAGE NOTES
BIBA for eval of left hip pain s/p MGLF. Strong smell of urine noted, straight cath for urine and depends changed. A&OX1.  In hospice for alzheimer's.

## 2018-03-30 ENCOUNTER — APPOINTMENT (OUTPATIENT)
Dept: RADIOLOGY | Facility: MEDICAL CENTER | Age: 78
DRG: 470 | End: 2018-03-30
Attending: ORTHOPAEDIC SURGERY
Payer: MEDICARE

## 2018-03-30 LAB
ANION GAP SERPL CALC-SCNC: 9 MMOL/L (ref 0–11.9)
BUN SERPL-MCNC: 19 MG/DL (ref 8–22)
CALCIUM SERPL-MCNC: 9.1 MG/DL (ref 8.5–10.5)
CHLORIDE SERPL-SCNC: 102 MMOL/L (ref 96–112)
CO2 SERPL-SCNC: 22 MMOL/L (ref 20–33)
CREAT SERPL-MCNC: 0.78 MG/DL (ref 0.5–1.4)
EKG IMPRESSION: NORMAL
ERYTHROCYTE [DISTWIDTH] IN BLOOD BY AUTOMATED COUNT: 44.2 FL (ref 35.9–50)
GLUCOSE SERPL-MCNC: 192 MG/DL (ref 65–99)
HCT VFR BLD AUTO: 34.8 % (ref 37–47)
HGB BLD-MCNC: 11.2 G/DL (ref 12–16)
LACTATE BLD-SCNC: 1.7 MMOL/L (ref 0.5–2)
MCH RBC QN AUTO: 26.5 PG (ref 27–33)
MCHC RBC AUTO-ENTMCNC: 32.2 G/DL (ref 33.6–35)
MCV RBC AUTO: 82.5 FL (ref 81.4–97.8)
PLATELET # BLD AUTO: 290 K/UL (ref 164–446)
PMV BLD AUTO: 9.5 FL (ref 9–12.9)
POTASSIUM SERPL-SCNC: 3.8 MMOL/L (ref 3.6–5.5)
RBC # BLD AUTO: 4.22 M/UL (ref 4.2–5.4)
SODIUM SERPL-SCNC: 133 MMOL/L (ref 135–145)
WBC # BLD AUTO: 12 K/UL (ref 4.8–10.8)

## 2018-03-30 PROCEDURE — 700101 HCHG RX REV CODE 250

## 2018-03-30 PROCEDURE — 0SRS01A REPLACEMENT OF LEFT HIP JOINT, FEMORAL SURFACE WITH METAL SYNTHETIC SUBSTITUTE, UNCEMENTED, OPEN APPROACH: ICD-10-PCS | Performed by: ORTHOPAEDIC SURGERY

## 2018-03-30 PROCEDURE — 700111 HCHG RX REV CODE 636 W/ 250 OVERRIDE (IP): Performed by: INTERNAL MEDICINE

## 2018-03-30 PROCEDURE — 770006 HCHG ROOM/CARE - MED/SURG/GYN SEMI*

## 2018-03-30 PROCEDURE — 85027 COMPLETE CBC AUTOMATED: CPT

## 2018-03-30 PROCEDURE — 160042 HCHG SURGERY MINUTES - EA ADDL 1 MIN LEVEL 5: Performed by: ORTHOPAEDIC SURGERY

## 2018-03-30 PROCEDURE — 72170 X-RAY EXAM OF PELVIS: CPT

## 2018-03-30 PROCEDURE — A9270 NON-COVERED ITEM OR SERVICE: HCPCS | Performed by: INTERNAL MEDICINE

## 2018-03-30 PROCEDURE — 502000 HCHG MISC OR IMPLANTS RC 0278: Performed by: ORTHOPAEDIC SURGERY

## 2018-03-30 PROCEDURE — 160035 HCHG PACU - 1ST 60 MINS PHASE I: Performed by: ORTHOPAEDIC SURGERY

## 2018-03-30 PROCEDURE — A9270 NON-COVERED ITEM OR SERVICE: HCPCS | Performed by: HOSPITALIST

## 2018-03-30 PROCEDURE — 99232 SBSQ HOSP IP/OBS MODERATE 35: CPT | Mod: GW | Performed by: HOSPITALIST

## 2018-03-30 PROCEDURE — 502578 HCHG PACK, TOTAL HIP: Performed by: ORTHOPAEDIC SURGERY

## 2018-03-30 PROCEDURE — 93005 ELECTROCARDIOGRAM TRACING: CPT | Performed by: ORTHOPAEDIC SURGERY

## 2018-03-30 PROCEDURE — 501480 HCHG STOCKINETTE: Performed by: ORTHOPAEDIC SURGERY

## 2018-03-30 PROCEDURE — 501838 HCHG SUTURE GENERAL: Performed by: ORTHOPAEDIC SURGERY

## 2018-03-30 PROCEDURE — 160002 HCHG RECOVERY MINUTES (STAT): Performed by: ORTHOPAEDIC SURGERY

## 2018-03-30 PROCEDURE — 160009 HCHG ANES TIME/MIN: Performed by: ORTHOPAEDIC SURGERY

## 2018-03-30 PROCEDURE — 700111 HCHG RX REV CODE 636 W/ 250 OVERRIDE (IP): Performed by: ORTHOPAEDIC SURGERY

## 2018-03-30 PROCEDURE — 700105 HCHG RX REV CODE 258: Performed by: INTERNAL MEDICINE

## 2018-03-30 PROCEDURE — 700102 HCHG RX REV CODE 250 W/ 637 OVERRIDE(OP): Performed by: HOSPITALIST

## 2018-03-30 PROCEDURE — 36415 COLL VENOUS BLD VENIPUNCTURE: CPT

## 2018-03-30 PROCEDURE — 160023 HCHG SPINAL: Performed by: ORTHOPAEDIC SURGERY

## 2018-03-30 PROCEDURE — 80048 BASIC METABOLIC PNL TOTAL CA: CPT

## 2018-03-30 PROCEDURE — 160031 HCHG SURGERY MINUTES - 1ST 30 MINS LEVEL 5: Performed by: ORTHOPAEDIC SURGERY

## 2018-03-30 PROCEDURE — 500367 HCHG DRAIN KIT, HEMOVAC: Performed by: ORTHOPAEDIC SURGERY

## 2018-03-30 PROCEDURE — 83605 ASSAY OF LACTIC ACID: CPT

## 2018-03-30 PROCEDURE — 160036 HCHG PACU - EA ADDL 30 MINS PHASE I: Performed by: ORTHOPAEDIC SURGERY

## 2018-03-30 PROCEDURE — 700111 HCHG RX REV CODE 636 W/ 250 OVERRIDE (IP)

## 2018-03-30 PROCEDURE — 700102 HCHG RX REV CODE 250 W/ 637 OVERRIDE(OP): Performed by: INTERNAL MEDICINE

## 2018-03-30 PROCEDURE — A6454 SELF-ADHER BAND W>=3" <5"/YD: HCPCS | Performed by: ORTHOPAEDIC SURGERY

## 2018-03-30 PROCEDURE — 87040 BLOOD CULTURE FOR BACTERIA: CPT

## 2018-03-30 PROCEDURE — 160048 HCHG OR STATISTICAL LEVEL 1-5: Performed by: ORTHOPAEDIC SURGERY

## 2018-03-30 DEVICE — IMPLANT TANDEM BIPOLAR COCR 47OD 28ID: Type: IMPLANTABLE DEVICE | Site: HIP | Status: FUNCTIONAL

## 2018-03-30 DEVICE — IMPLANT COCR 12/14 FEM HEAD 28 +0: Type: IMPLANTABLE DEVICE | Site: HIP | Status: FUNCTIONAL

## 2018-03-30 DEVICE — IMPLANTABLE DEVICE: Type: IMPLANTABLE DEVICE | Site: HIP | Status: FUNCTIONAL

## 2018-03-30 RX ORDER — MORPHINE SULFATE 4 MG/ML
INJECTION, SOLUTION INTRAMUSCULAR; INTRAVENOUS
Status: DISCONTINUED | OUTPATIENT
Start: 2018-03-30 | End: 2018-03-30 | Stop reason: HOSPADM

## 2018-03-30 RX ORDER — KETOROLAC TROMETHAMINE 30 MG/ML
INJECTION, SOLUTION INTRAMUSCULAR; INTRAVENOUS
Status: DISCONTINUED | OUTPATIENT
Start: 2018-03-30 | End: 2018-03-30 | Stop reason: HOSPADM

## 2018-03-30 RX ORDER — SODIUM CHLORIDE 9 MG/ML
500 INJECTION, SOLUTION INTRAVENOUS
Status: COMPLETED | OUTPATIENT
Start: 2018-03-30 | End: 2018-03-30

## 2018-03-30 RX ORDER — DONEPEZIL HYDROCHLORIDE 5 MG/1
22.5 TABLET, FILM COATED ORAL DAILY
Status: DISCONTINUED | OUTPATIENT
Start: 2018-03-30 | End: 2018-04-03 | Stop reason: HOSPADM

## 2018-03-30 RX ORDER — L. ACIDOPHILUS/L.BULGARICUS 100MM CELL
1 GRANULES IN PACKET (EA) ORAL
Status: DISCONTINUED | OUTPATIENT
Start: 2018-03-30 | End: 2018-04-03 | Stop reason: HOSPADM

## 2018-03-30 RX ORDER — BUPIVACAINE HYDROCHLORIDE 2.5 MG/ML
INJECTION, SOLUTION EPIDURAL; INFILTRATION; INTRACAUDAL
Status: DISCONTINUED | OUTPATIENT
Start: 2018-03-30 | End: 2018-03-30 | Stop reason: HOSPADM

## 2018-03-30 RX ADMIN — ATORVASTATIN CALCIUM 20 MG: 20 TABLET, FILM COATED ORAL at 22:01

## 2018-03-30 RX ADMIN — MEMANTINE 5 MG: 10 TABLET ORAL at 22:01

## 2018-03-30 RX ADMIN — SODIUM CHLORIDE 500 ML: 9 INJECTION, SOLUTION INTRAVENOUS at 04:22

## 2018-03-30 RX ADMIN — STANDARDIZED SENNA CONCENTRATE AND DOCUSATE SODIUM 2 TABLET: 8.6; 5 TABLET, FILM COATED ORAL at 22:04

## 2018-03-30 RX ADMIN — CETIRIZINE HYDROCHLORIDE 10 MG: 10 TABLET, FILM COATED ORAL at 12:13

## 2018-03-30 RX ADMIN — LACTOBACILLUS ACIDOPHILUS / LACTOBACILLUS BULGARICUS 1 PACKET: 100 MILLION CFU STRENGTH GRANULES at 16:33

## 2018-03-30 RX ADMIN — STANDARDIZED SENNA CONCENTRATE AND DOCUSATE SODIUM 2 TABLET: 8.6; 5 TABLET, FILM COATED ORAL at 12:12

## 2018-03-30 RX ADMIN — OXYCODONE HYDROCHLORIDE 5 MG: 5 TABLET ORAL at 22:10

## 2018-03-30 RX ADMIN — MEMANTINE 5 MG: 10 TABLET ORAL at 12:12

## 2018-03-30 RX ADMIN — EPHEDRINE SULFATE 25 MG: 50 INJECTION INTRAMUSCULAR; INTRAVENOUS; SUBCUTANEOUS at 09:20

## 2018-03-30 RX ADMIN — ASPIRIN 81 MG: 81 TABLET, COATED ORAL at 12:13

## 2018-03-30 RX ADMIN — CEFAZOLIN SODIUM 1 G: 1 INJECTION, SOLUTION INTRAVENOUS at 16:33

## 2018-03-30 RX ADMIN — DONEPEZIL HYDROCHLORIDE 22.5 MG: 5 TABLET, FILM COATED ORAL at 13:40

## 2018-03-30 RX ADMIN — POTASSIUM CHLORIDE 20 MEQ: 1500 TABLET, EXTENDED RELEASE ORAL at 12:12

## 2018-03-30 RX ADMIN — AMLODIPINE BESYLATE 10 MG: 10 TABLET ORAL at 12:12

## 2018-03-30 RX ADMIN — Medication 1000 MCG: at 12:11

## 2018-03-30 RX ADMIN — OMEPRAZOLE 20 MG: 20 CAPSULE, DELAYED RELEASE ORAL at 12:11

## 2018-03-30 RX ADMIN — QUETIAPINE FUMARATE 50 MG: 25 TABLET ORAL at 22:02

## 2018-03-30 RX ADMIN — LACTOBACILLUS ACIDOPHILUS / LACTOBACILLUS BULGARICUS 1 PACKET: 100 MILLION CFU STRENGTH GRANULES at 12:13

## 2018-03-30 RX ADMIN — SODIUM CHLORIDE: 9 INJECTION, SOLUTION INTRAVENOUS at 03:40

## 2018-03-30 RX ADMIN — CEFTRIAXONE 2 G: 2 INJECTION, POWDER, FOR SOLUTION INTRAMUSCULAR; INTRAVENOUS at 04:05

## 2018-03-30 ASSESSMENT — PAIN SCALES - WONG BAKER
WONGBAKER_NUMERICALRESPONSE: HURTS EVEN MORE
WONGBAKER_NUMERICALRESPONSE: DOESN'T HURT AT ALL
WONGBAKER_NUMERICALRESPONSE: DOESN'T HURT AT ALL

## 2018-03-30 ASSESSMENT — PAIN SCALES - GENERAL
PAINLEVEL_OUTOF10: 0
PAINLEVEL_OUTOF10: ASSUMED PAIN PRESENT
PAINLEVEL_OUTOF10: 0
PAINLEVEL_OUTOF10: 3
PAINLEVEL_OUTOF10: 0

## 2018-03-30 ASSESSMENT — ENCOUNTER SYMPTOMS: WEAKNESS: 1

## 2018-03-30 ASSESSMENT — PATIENT HEALTH QUESTIONNAIRE - PHQ9
1. LITTLE INTEREST OR PLEASURE IN DOING THINGS: NOT AT ALL
SUM OF ALL RESPONSES TO PHQ9 QUESTIONS 1 AND 2: 0
2. FEELING DOWN, DEPRESSED, IRRITABLE, OR HOPELESS: NOT AT ALL

## 2018-03-30 ASSESSMENT — LIFESTYLE VARIABLES
EVER_SMOKED: NEVER
ALCOHOL_USE: NO
DO YOU DRINK ALCOHOL: NO

## 2018-03-30 NOTE — PROGRESS NOTES
Orthopaedics  Patient seen and examined agree with diagnosis and plan  Hemiarthroplasty  Family here explained all to daughter  Patient has memory deficiency  Chart X-rays all ok for surgery  Dowd

## 2018-03-30 NOTE — ED PROVIDER NOTES
CHIEF COMPLAINT   Chief Complaint   Patient presents with   • Hip Pain   • GLF       HPI   Nena Lamb is a 77 y.o. female who presents complaining of left hip pain. Patient's unable in the leg. Patient is in hospice for Alzheimer's disease. She do ground-level fall today. She is unable to get up. She has an obvious deformity to her hip. There is no head injury. Patient denies head neck chest back or abdominal pain. No further details of the history present illness could be obtained within the constraints of the patient's current mental status. She has Alzheimer's and is quite demented.      REVIEW OF SYSTEMS   See HPI for further details. No fever or chills. No cough or cold symptoms. No vomiting or diarrhea. All other systems are negative.    PAST MEDICAL HISTORY   Past Medical History:   Diagnosis Date   • Dementia 11/17/2014   • Diet-controlled diabetes mellitus (CMS-[facility]) 11/1/2017   • Hyperlipidemia 11/17/2014   • Hypertension    • Insomnia 4/8/2016       FAMILY HISTORY  Family History   Problem Relation Age of Onset   • Cancer Mother      colon   • Cancer Father      lung   • Diabetes Paternal Grandmother    • Stroke Neg Hx    • Heart Attack Neg Hx        SOCIAL HISTORY  Social History     Social History   • Marital status: Single     Spouse name: N/A   • Number of children: N/A   • Years of education: N/A     Social History Main Topics   • Smoking status: Former Smoker     Packs/day: 1.00     Years: 20.00   • Smokeless tobacco: Never Used   • Alcohol use No   • Drug use: No   • Sexual activity: Not Currently     Partners: Male     Other Topics Concern   • Not on file     Social History Narrative    Lives in a group home ([facility] on [address] in [place]) since she moved to [place] from [place] in 2014 because of her dementia. Retired from [employer].        SURGICAL HISTORY  Past Surgical History:   Procedure Laterality Date   • ABDOMINAL HYSTERECTOMY TOTAL      heavy periods       CURRENT  "MEDICATIONS   Home Medications     Reviewed by Yuliya Martin R.N. (Registered Nurse) on 03/29/18 at 1628  Med List Status: Not Addressed   Medication Last Dose Status   amlodipine (NORVASC) 10 MG Tab  Active   aspirin EC (ECOTRIN) 81 MG Tablet Delayed Response 11/7/2017 Active   atorvastatin (LIPITOR) 20 MG Tab  Active   cetirizine (ZYRTEC) 10 MG Tab 11/7/2017 Active   Cyanocobalamin (VITAMIN B-12) 1000 MCG Tab 11/7/2017 Active   Donepezil HCl 23 MG Tab  Active   NAMENDA XR 14 MG CAPSULE SR 24 HR  Active   nitrofurantoin macro crystals (MACRODANTIN) 100 MG Cap  Active   omeprazole (PRILOSEC) 20 MG delayed-release capsule  Active   Polyethylene Glycol 3350 (PEG 3350) Pack  Active   potassium chloride SA (KDUR) 20 MEQ Tab CR  Active   quetiapine (SEROQUEL) 50 MG tablet 11/6/2017 Active                ALLERGIES   No Known Allergies    PHYSICAL EXAM  VITAL SIGNS: /61   Pulse (!) 113   Temp 37 °C (98.6 °F)   Resp 18   Ht 1.626 m (5' 4\")   Wt 63.5 kg (140 lb)   SpO2 92%   BMI 24.03 kg/m²   Constitutional: Well developed, Well nourished, No acute distress, Non-toxic appearance. Pleasant. Elderly.  Cardiovascular: Normal heart rate, Normal rhythm, No murmurs, No rubs, No gallops.   Thorax & Lungs: Normal breath sounds, No respiratory distress, No wheezing, No chest tenderness.   Abdomen: Bowel sounds normal, Soft, No tenderness, No masses, No pulsatile masses.   Skin: Warm, Dry, No erythema, No rash.   Extremities: Intact distal pulses, No cyanosis, No clubbing.   Musculoskeletal: Obvious deformity left hip. Her left lower extremity is externally rotate it and shortened.  Neurologic: Alert & oriented x 1, Normal motor function, Normal sensory function, No focal deficits noted.     RADIOLOGY/PROCEDURES  DX-FEMUR-1 VIEW LEFT   Final Result         Acute angulated displaced fracture of the left femoral neck.      No distal femur fracture.      DX-PELVIS-1 OR 2 VIEWS   Final Result         Acute angulated " displaced fracture of the left femoral neck.      DX-CHEST-LIMITED (1 VIEW)   Final Result         No acute cardiopulmonary abnormalities are identified.        Results for orders placed or performed during the hospital encounter of 03/29/18   CBC w/ Differential   Result Value Ref Range    WBC 12.4 (H) 4.8 - 10.8 K/uL    RBC 4.40 4.20 - 5.40 M/uL    Hemoglobin 12.2 12.0 - 16.0 g/dL    Hematocrit 37.7 37.0 - 47.0 %    MCV 85.7 81.4 - 97.8 fL    MCH 27.7 27.0 - 33.0 pg    MCHC 32.4 (L) 33.6 - 35.0 g/dL    RDW 46.1 35.9 - 50.0 fL    Platelet Count 348 164 - 446 K/uL    MPV 9.4 9.0 - 12.9 fL    Neutrophils-Polys 82.60 (H) 44.00 - 72.00 %    Lymphocytes 11.50 (L) 22.00 - 41.00 %    Monocytes 4.40 0.00 - 13.40 %    Eosinophils 0.60 0.00 - 6.90 %    Basophils 0.20 0.00 - 1.80 %    Immature Granulocytes 0.70 0.00 - 0.90 %    Nucleated RBC 0.00 /100 WBC    Neutrophils (Absolute) 10.21 (H) 2.00 - 7.15 K/uL    Lymphs (Absolute) 1.42 1.00 - 4.80 K/uL    Monos (Absolute) 0.54 0.00 - 0.85 K/uL    Eos (Absolute) 0.07 0.00 - 0.51 K/uL    Baso (Absolute) 0.03 0.00 - 0.12 K/uL    Immature Granulocytes (abs) 0.09 0.00 - 0.11 K/uL    NRBC (Absolute) 0.00 K/uL   Basic Metabolic Panel (BMP)   Result Value Ref Range    Sodium 136 135 - 145 mmol/L    Potassium 4.1 3.6 - 5.5 mmol/L    Chloride 103 96 - 112 mmol/L    Co2 22 20 - 33 mmol/L    Glucose 165 (H) 65 - 99 mg/dL    Bun 26 (H) 8 - 22 mg/dL    Creatinine 0.97 0.50 - 1.40 mg/dL    Calcium 9.8 8.5 - 10.5 mg/dL    Anion Gap 11.0 0.0 - 11.9   Prothrombin (PT/INR)   Result Value Ref Range    PT 13.3 12.0 - 14.6 sec    INR 1.04 0.87 - 1.13   APTT   Result Value Ref Range    APTT 28.1 24.7 - 36.0 sec   Urinalysis, culture if indicated   Result Value Ref Range    Color Yellow     Character Turbid (A)     Specific Gravity 1.021 <1.035    Ph 7.5 5.0 - 8.0    Glucose Negative Negative mg/dL    Ketones Trace (A) Negative mg/dL    Protein 30 (A) Negative mg/dL    Bilirubin Negative Negative     Urobilinogen, Urine 0.2 Negative    Nitrite Negative Negative    Leukocyte Esterase Large (A) Negative    Occult Blood Negative Negative    Micro Urine Req Microscopic     Culture Indicated Yes UA Culture   ESTIMATED GFR   Result Value Ref Range    GFR If African American >60 >60 mL/min/1.73 m 2    GFR If Non African American 55 (A) >60 mL/min/1.73 m 2   URINE MICROSCOPIC (W/UA)   Result Value Ref Range    WBC 20-50 (A) /hpf    RBC 0-2 /hpf    Bacteria Many (A) None /hpf    Epithelial Cells Negative /hpf    Hyaline Cast 0-2 /lpf         COURSE & MEDICAL DECISION MAKING  Pertinent Labs & Imaging studies reviewed. (See chart for details)  Orthopedic consultation was obtained. Patient was given Rocephin. Patient was admitted to Dr. Vigil. Dr. Greenwood will provide a consult from orthopedics. I discussed this with the family at length. They've left their phone number with nursing. Patient is admitted in guarded condition    FINAL IMPRESSION  1. Left-sided hip fracture  2. Alzheimer's dementia  3. UTI        Electronically signed by: Mk Turner, 3/29/2018 5:51 PM  ED Provider Note

## 2018-03-30 NOTE — OP REPORT
DATE OF SERVICE:  03/30/2018    PREOPERATIVE DIAGNOSIS:  Garden IV displaced geriatric femoral neck fracture.    POSTOPERATIVE DIAGNOSIS:  Garden IV displaced geriatric femoral neck fracture.    OPERATION PERFORMED:  Press-fit hemiarthroplasty, posterior approach.    SURGEON:  Zurdo Dowd MD    ASSISTANT:  Lupillo Murry MD    INDICATIONS FOR THE OPERATION:  Pain, hip fracture in elderly patient.    BLOOD LOSS:  100 mL    MEDICATIONS UTILIZED:  Tranexamic acid and Ancef.    OPERATION:  The patient was brought to the operating room, awake, alert,   placed on the operating table in supine position, and then rolled in lateral   position routine for hip arthroplasty.  Spacesuits were utilized, the doors   marked arthroplasty case.  Patient was given medications above.  The hip was   then shaved, scrubbed, prepped and draped in normal sterile routine fashion.    Posterior approach to the hip, subcutaneous tissue dissected down to the   tensor, which was opened and then a vertical incision along the piriformis   tendon and then the capsular dissection off of the bone.  We tagged that with   #5 Tycron for exposure, removed the femoral head and then had easy access to   the proximal femur.  We made a femoral neck cut about 1.5 cm above the   palpable lesser trochanter and then using hand reamers and broaches, we   broached to a 12, chose a 12 Synergy press fit stem with a tight fit and no   complication.  A similar 47 bipolar head cleaned and irrigated the Salazar taper   and then assembled our bipolar.  We checked the acetabulum and it was cleared   of any debris.  We reduced the hip and basically the operation was completed.    We now closed the capsule with #5 Tycron up through the trochanter for a tight   capsular fit, #1 Vicryl for the tensor, 2-0 subcutaneous, skin staples,   sterile compression dressing, and the patient was taken to recovery room in   stable condition.  No intraoperative or immediate postop  complications.    Patient tolerated the procedure well.       ____________________________________     MD CHACORTA TODD / LOGAN    DD:  03/30/2018 09:02:17  DT:  03/30/2018 10:03:00    D#:  [id number]  Job#:  [id number]

## 2018-03-30 NOTE — ASSESSMENT & PLAN NOTE
Pleasant. No behavioral disturbances.   - Continue Namenda and Aricept  - Frequent reorientation as needed

## 2018-03-30 NOTE — PROGRESS NOTES
[facility]    Date of Service: 3/30/2018    Chief Complaint  77 y.o. female admitted 3/29/2018 with an level fall resulting in hip pain.    Interval Problem Update  Patient was already gone for surgery in the morning. He came to evaluate her after she returned from the PACU. She was sleeping and in no acute distress with her lunch half eaten. When I told her that she just came back from surgery she said \"oh, okay\".    Consultants/Specialty  Ortho    Disposition  Pending medical and surgical clearance.        Review of Systems   Unable to perform ROS: Dementia   Constitutional: Positive for malaise/fatigue.   Musculoskeletal: Positive for joint pain.   Neurological: Positive for weakness.      Physical Exam  Laboratory/Imaging   Hemodynamics  Temp (24hrs), Av °C (98.6 °F), Min:36.7 °C (98 °F), Max:37.6 °C (99.6 °F)   Temperature: 36.7 °C (98 °F)  Pulse  Av.7  Min: 98  Max: 117 Heart Rate (Monitored): (!) 101  Blood Pressure : 125/68, NIBP: 118/49      Respiratory      Respiration: 17, Pulse Oximetry: 94 %        RUL Breath Sounds: Clear, RML Breath Sounds: Clear, RLL Breath Sounds: Diminished, RANDY Breath Sounds: Clear, LLL Breath Sounds: Diminished    Fluids    Intake/Output Summary (Last 24 hours) at 18 1543  Last data filed at 18 1130   Gross per 24 hour   Intake             1725 ml   Output              100 ml   Net             1625 ml       Nutrition  Orders Placed This Encounter   Procedures   • DIET ORDER     Standing Status:   Standing     Number of Occurrences:   1     Order Specific Question:   Diet:     Answer:   Regular [1]     Physical Exam   Constitutional: She is oriented to person, place, and time. She appears well-developed. No distress.   HENT:   Head: Normocephalic.   Mouth/Throat: Oropharynx is clear and moist.   Eyes: EOM are normal. No scleral icterus.   Neck: Normal range of motion. Neck supple.   Cardiovascular: Regular rhythm and intact distal " pulses.  Tachycardia present.    Pulmonary/Chest: Breath sounds normal. No respiratory distress. She has no rales.   Abdominal: Soft. She exhibits no distension. There is no tenderness.   Musculoskeletal: She exhibits tenderness (hip). She exhibits no edema.   Lymphadenopathy:     She has no cervical adenopathy.   Neurological: She is alert and oriented to person, place, and time.   Skin: Skin is warm and dry.   Psychiatric: She has a normal mood and affect. Cognition and memory are impaired.   Vitals reviewed.      Recent Labs      03/29/18   1530  03/30/18   0238   WBC  12.4*  12.0*   RBC  4.40  4.22   HEMOGLOBIN  12.2  11.2*   HEMATOCRIT  37.7  34.8*   MCV  85.7  82.5   MCH  27.7  26.5*   MCHC  32.4*  32.2*   RDW  46.1  44.2   PLATELETCT  348  290   MPV  9.4  9.5     Recent Labs      03/29/18   1530  03/30/18   0238   SODIUM  136  133*   POTASSIUM  4.1  3.8   CHLORIDE  103  102   CO2  22  22   GLUCOSE  165*  192*   BUN  26*  19   CREATININE  0.97  0.78   CALCIUM  9.8  9.1     Recent Labs      03/29/18   1530   APTT  28.1   INR  1.04                  Assessment/Plan     * Displaced fracture of left femoral neck (CMS-HCC)- (present on admission)   Assessment & Plan    From hospice for advanced dementia. Reportedly chair and bed bound prior to her fall, can only help with transfers. Found to have femoral neck fracture.  - Admitted for ortho consultation and pain control  - Status post hemiarthroplasty on 3/30  - PT/OT, will likely need continued therapies on discharge  - Will check CBC in the morning        Urinary tract infection- (present on admission)   Assessment & Plan    Urinalysis positive and culture returned with Proteus. Poor historian so unable to tell if she is symptomatic  - Antibiotics ×3 days         Hypertension- (present on admission)   Assessment & Plan    Blood pressure borderline low  - hold home amlodipine        Advanced dementia   Assessment & Plan    Pleasant. No behavioral disturbances.   -  Continue Namenda and Aricept  - Frequent reorientation as needed        Hyperlipidemia- (present on admission)   Assessment & Plan    Continue statin          Quality-Core Measures   Reviewed items::  EKG reviewed, Radiology images reviewed, Labs reviewed and Medications reviewed  Almaguer catheter::  No Almaguer  DVT: held for surgery.  DVT prophylaxis - mechanical:  SCDs  Ulcer Prophylaxis::  Yes

## 2018-03-30 NOTE — ASSESSMENT & PLAN NOTE
From hospice for advanced dementia. Reportedly chair and bed bound prior to her fall, can only help with transfers. Found to have femoral neck fracture.  - ortho following  - pain control  - Status post hemiarthroplasty on 3/30  - PT/OT, will likely need continued therapies on discharge

## 2018-03-30 NOTE — CARE PLAN
Problem: Venous Thromboembolism (VTW)/Deep Vein Thrombosis (DVT) Prevention:  Goal: Patient will participate in Venous Thrombosis (VTE)/Deep Vein Thrombosis (DVT)Prevention Measures  Outcome: PROGRESSING AS EXPECTED  No s/s of DVT. Pt has on SCDs. Pt moves around in bed some. Pt will started pharmalogical prophylaxis after surgery.    Problem: Urinary Elimination:  Goal: Ability to reestablish a normal urinary elimination pattern will improve  Outcome: PROGRESSING AS EXPECTED  Pt is incontinent. Assumed pt is incontinent before also because pt had a diaper on when she came up.

## 2018-03-30 NOTE — PROGRESS NOTES
Hourly rounding, call bell within reach. Patient educated about plan of care, pain management, call bell use, and mobility. Patient had surgery this AM for left hip fracture. Arrived back from surgery in stable condition, at baseline cognition per daughter at bedside. Patient knows name only. Patient eating without issue. O2 still required post-op, will monitor. SCD's in place. No complaints of pain. Dressing to left hip dry and intact. Weight bearing as tolerated to left side.

## 2018-03-30 NOTE — PROGRESS NOTES
Pt arrived to floor at 2015. Pr is orientated to the room. Pt is a and o to x 1(self). Pt was taught how to use the call light. Pt was told we will come in and check on her also.

## 2018-03-30 NOTE — CONSULTS
3/30/2018    Nena Lamb is a 77 y.o. female who presents with Left hip pain after a fall at her group home yesterday.  She is a minimal ambulator, in home with Alzheimer's.  Family involved and present today.    Past Medical History:   Diagnosis Date   • Dementia 11/17/2014   • Diet-controlled diabetes mellitus (CMS-HCC) 11/1/2017   • Hyperlipidemia 11/17/2014   • Hypertension    • Insomnia 4/8/2016       Past Surgical History:   Procedure Laterality Date   • ABDOMINAL HYSTERECTOMY TOTAL      heavy periods       Medications  No current facility-administered medications on file prior to encounter.      Current Outpatient Prescriptions on File Prior to Encounter   Medication Sig Dispense Refill   • Polyethylene Glycol 3350 (PEG 3350) Pack MIX 1 PACKET INTO 8OZ OF JUICE OR WATER AND DRINK ONCE DAILY. 30 Packet 1   • potassium chloride SA (KDUR) 20 MEQ Tab CR TAKE (1) TABLET BY MOUTH TWICE DAILY. 180 Tab 1   • NAMENDA XR 14 MG CAPSULE SR 24 HR TAKE (1) CAPSULE BY MOUTH ONCE DAILY. 90 Cap 1   • Donepezil HCl 23 MG Tab TAKE 1 TABLET BY MOUTH ONCE DAILY. 90 Tab 1   • atorvastatin (LIPITOR) 20 MG Tab TAKE 1 TABLET BY MOUTH ONCE DAILY. 90 Tab 1   • omeprazole (PRILOSEC) 20 MG delayed-release capsule TAKE (1) CAPSULE BY MOUTH ONCE DAILY. 90 Cap 1   • amlodipine (NORVASC) 10 MG Tab Take 1 Tab by mouth every day. 90 Tab 1   • quetiapine (SEROQUEL) 50 MG tablet Take once tablet in the evening 90 Tab 3   • Cyanocobalamin (VITAMIN B-12) 1000 MCG Tab Take 1 Tab by mouth every day. 90 Tab 3   • aspirin EC (ECOTRIN) 81 MG Tablet Delayed Response Take 81 mg by mouth every day.     • cetirizine (ZYRTEC) 10 MG Tab Take 10 mg by mouth every day.         Allergies  Patient has no known allergies.    ROS   All other systems were reviewed and found to be negative    Family History   Problem Relation Age of Onset   • Cancer Mother      colon   • Cancer Father      lung   • Diabetes Paternal Grandmother    • Stroke Neg Hx    • Heart Attack  "Neg Hx        Social History     Social History   • Marital status: Single     Spouse name: N/A   • Number of children: N/A   • Years of education: N/A     Social History Main Topics   • Smoking status: Former Smoker     Packs/day: 1.00     Years: 20.00   • Smokeless tobacco: Never Used   • Alcohol use No   • Drug use: No   • Sexual activity: Not Currently     Partners: Male     Other Topics Concern   • Not on file     Social History Narrative    Lives in a group home ([facility] on [email or web address] in [place]) since she moved to [place] from [place] in 2014 because of her dementia. Retired from [employer].        Physical Exam  Vitals  Blood pressure 144/87, pulse (!) 114, temperature 36.9 °C (98.5 °F), resp. rate 17, height 1.626 m (5' 4\"), weight 63.5 kg (140 lb), SpO2 92 %.    General: Well Developed, Well Nourished, no acute distress  Psychiatric:Very pleasantly confused.  HEENT: Normocephalic, atraumatic  Skin: Intact, no open wounds  LLE: Pain with log roll.  SILT S/S/SP/DP/T dist, palp PT and DP pulses.  Able to flex/ex ankle and wiggle toes.    Radiographs:  DX-FEMUR-1 VIEW LEFT   Final Result         Acute angulated displaced fracture of the left femoral neck.      No distal femur fracture.      DX-PELVIS-1 OR 2 VIEWS   Final Result         Acute angulated displaced fracture of the left femoral neck.      DX-CHEST-LIMITED (1 VIEW)   Final Result         No acute cardiopulmonary abnormalities are identified.          Laboratory Values  Recent Labs      03/29/18   1530  03/30/18   0238   WBC  12.4*  12.0*   RBC  4.40  4.22   HEMOGLOBIN  12.2  11.2*   HEMATOCRIT  37.7  34.8*   MCV  85.7  82.5   MCH  27.7  26.5*   MCHC  32.4*  32.2*   RDW  46.1  44.2   PLATELETCT  348  290   MPV  9.4  9.5     Recent Labs      03/29/18   1530  03/30/18   0238   SODIUM  136  133*   POTASSIUM  4.1  3.8   CHLORIDE  103  102   CO2  22  22   GLUCOSE  165*  192*   BUN  26*  19     Recent Labs      03/29/18   1530   APTT  " 28.1   INR  1.04         Impression:    #1 76yo female with Left displaced femoral neck fx    Plan:    NPO for Left hip hemiarthroplasty today with Dr. Nile CALVILLO LLE for now  Will need PT postop  DC Planning postop to decide best placement    Jacques Greenwood MD

## 2018-03-30 NOTE — H&P
Hospital Medicine History and Physical    Date of Service  3/29/2018    Chief Complaint  Chief Complaint   Patient presents with   • Hip Pain   • GLF       History of Presenting Illness  77 y.o. female who presented 3/29/2018 with Hip Pain and GLF  She is from nursing home and reportedly hospice care.  She has advanced dementia but is pleasant. She remembers she fell but =has no further details. No family present.  Basically her caregivers transferred her here for fall and she sustained hip fracture. Report is that she is almost wheel or wheelchair bound in terms of mobility.  Though it is reported she is hospice, she is however taking antihypertensives and statins.  She denied chest pain, SOB, lightheadedness or palpitations.  At the ED, afebrile, hemodynamically stable.Xray showed displaced femoral neck fracture. Dr. Greenwood, Orthopedics consulted.  When I saw her at ED, she is pleasant. Cognitive deficits, extreme memory loss. Hip soreness. Auscultation clear.    Primary Care Physician  Vasquez Mary M.D.    Consultants  orthopedics    Code Status  DNR    Review of Systems  Review of Systems   Unable to perform ROS: Mental acuity        Past Medical History  Past Medical History:   Diagnosis Date   • Diet-controlled diabetes mellitus (CMS-[facility]) 11/1/2017   • Insomnia 4/8/2016   • Dementia 11/17/2014   • Hyperlipidemia 11/17/2014   • Hypertension        Surgical History  Past Surgical History:   Procedure Laterality Date   • ABDOMINAL HYSTERECTOMY TOTAL      heavy periods       Medications  No current facility-administered medications on file prior to encounter.      Current Outpatient Prescriptions on File Prior to Encounter   Medication Sig Dispense Refill   • Polyethylene Glycol 3350 (PEG 3350) Pack MIX 1 PACKET INTO 8OZ OF JUICE OR WATER AND DRINK ONCE DAILY. 30 Packet 1   • potassium chloride SA (KDUR) 20 MEQ Tab CR TAKE (1) TABLET BY MOUTH TWICE DAILY. 180 Tab 1   • NAMENDA XR 14 MG CAPSULE SR 24 HR TAKE (1)  CAPSULE BY MOUTH ONCE DAILY. 90 Cap 1   • Donepezil HCl 23 MG Tab TAKE 1 TABLET BY MOUTH ONCE DAILY. 90 Tab 1   • atorvastatin (LIPITOR) 20 MG Tab TAKE 1 TABLET BY MOUTH ONCE DAILY. 90 Tab 1   • omeprazole (PRILOSEC) 20 MG delayed-release capsule TAKE (1) CAPSULE BY MOUTH ONCE DAILY. 90 Cap 1   • amlodipine (NORVASC) 10 MG Tab Take 1 Tab by mouth every day. 90 Tab 1   • quetiapine (SEROQUEL) 50 MG tablet Take once tablet in the evening 90 Tab 3   • Cyanocobalamin (VITAMIN B-12) 1000 MCG Tab Take 1 Tab by mouth every day. 90 Tab 3   • aspirin EC (ECOTRIN) 81 MG Tablet Delayed Response Take 81 mg by mouth every day.     • cetirizine (ZYRTEC) 10 MG Tab Take 10 mg by mouth every day.         Family History  Family History   Problem Relation Age of Onset   • Cancer Mother      colon   • Cancer Father      lung   • Diabetes Paternal Grandmother    • Stroke Neg Hx    • Heart Attack Neg Hx        Social History  Social History   Substance Use Topics   • Smoking status: Former Smoker     Packs/day: 1.00     Years: 20.00   • Smokeless tobacco: Never Used   • Alcohol use No       Allergies  No Known Allergies     Physical Exam  Laboratory   Hemodynamics  Temp (24hrs), Av.1 °C (98.7 °F), Min:37 °C (98.6 °F), Max:37.1 °C (98.7 °F)   Temperature: 37.1 °C (98.7 °F)  Pulse  Av.2  Min: 98  Max: 115    Blood Pressure : 152/78, NIBP: 136/61      Respiratory      Respiration: 16, Pulse Oximetry: 90 %             Physical Exam   Constitutional: She appears well-developed and well-nourished.   HENT:   Head: Normocephalic and atraumatic.   Eyes: Conjunctivae and EOM are normal. No scleral icterus.   Neck: Normal range of motion. Neck supple.   Cardiovascular: Normal rate and regular rhythm.  Exam reveals no gallop and no friction rub.    No murmur heard.  Pulmonary/Chest: Effort normal and breath sounds normal. No respiratory distress. She has no wheezes. She has no rales.   Abdominal: Soft. Bowel sounds are normal. She exhibits  no distension. There is no tenderness. There is no rebound and no guarding.   Musculoskeletal: She exhibits tenderness (L hip). She exhibits no edema.   Neurological: She is alert.   Severe cognitive deficits   Skin: Skin is warm.   Psychiatric: She has a normal mood and affect. Her behavior is normal.       Recent Labs      03/29/18   1530   WBC  12.4*   RBC  4.40   HEMOGLOBIN  12.2   HEMATOCRIT  37.7   MCV  85.7   MCH  27.7   MCHC  32.4*   RDW  46.1   PLATELETCT  348   MPV  9.4     Recent Labs      03/29/18   1530   SODIUM  136   POTASSIUM  4.1   CHLORIDE  103   CO2  22   GLUCOSE  165*   BUN  26*   CREATININE  0.97   CALCIUM  9.8     Recent Labs      03/29/18   1530   GLUCOSE  165*     Recent Labs      03/29/18   1530   APTT  28.1   INR  1.04             Lab Results   Component Value Date    TROPONINI <0.01 11/07/2017     Urinalysis:    Lab Results  Component Value Date/Time   SPECGRAVITY 1.021 03/29/2018 1530   GLUCOSEUR Negative 03/29/2018 1530   KETONES Trace (A) 03/29/2018 1530   NITRITE Negative 03/29/2018 1530   WBCURINE 20-50 (A) 03/29/2018 1530   RBCURINE 0-2 03/29/2018 1530   BACTERIA Many (A) 03/29/2018 1530   EPITHELCELL Negative 03/29/2018 1530        Imaging  Dx-chest-limited (1 View)    Result Date: 3/29/2018  3/29/2018 3:57 PM HISTORY/REASON FOR EXAM:  Ground-level fall. Left hip injury. TECHNIQUE/EXAM DESCRIPTION AND NUMBER OF VIEWS: Single portable view of the chest. COMPARISON: 11/7/2017 FINDINGS: No pulmonary infiltrates or consolidations are noted. No pleural effusion. No pneumothorax. Stable cardiopericardial silhouette.     No acute cardiopulmonary abnormalities are identified.    Dx-pelvis-1 Or 2 Views    Result Date: 3/29/2018  3/29/2018 3:56 PM HISTORY/REASON FOR EXAM:  Pelvic/Hip Pain Following Trauma TECHNIQUE/EXAM DESCRIPTION AND NUMBER OF VIEWS:  1 view(s) of the pelvis. COMPARISON:  None. FINDINGS: Limited evaluation of the sacrum and bilateral iliac crests due to overlying bowel  content. Acute angulated displaced fracture of the left femoral neck. Moderate osteoarthritis of the bilateral hip joints. Diffuse osseous demineralization.     Acute angulated displaced fracture of the left femoral neck.    Dx-femur-1 View Left    Result Date: 3/29/2018  3/29/2018 3:57 PM HISTORY/REASON FOR EXAM:  Ground-level fall TECHNIQUE/EXAM DESCRIPTION AND NUMBER OF VIEWS:  2 view(s) of the pelvis. COMPARISON:  None. FINDINGS: Acute angulated displaced fracture of the left femoral neck. Moderate osteoarthritis of the left hip joint. Mild osteoarthritis on the left knee joint. No distal femur fracture. Diffuse osseous demineralization.     Acute angulated displaced fracture of the left femoral neck. No distal femur fracture.     Assessment/Plan     I anticipate this patient will require at least two midnights for appropriate medical management, necessitating inpatient admission.    * Displaced fracture of left femoral neck (CMS-HCC)   Assessment & Plan    From hospice for advanced dementia  She however is still taking prescription medications.  Reportedly chair bound on transfer  High risk given dementia however no angina, respiratory failure, syncope or malignant arrhythmia to postpone procedure, if decided  Medically optimized  Dr. Greenwood, Ortho consulted, ultimately defer to him if surgery is beneficial for pain control and ability to make transfers versus the risk        Urinary tract infection- (present on admission)   Assessment & Plan    Ordered urinalysis and that came back UTI  Treat for UTI with IV antibiotics        Advanced dementia   Assessment & Plan    Pleasant  Continue Jasvir  Currently DNR        Hospice care   Assessment & Plan    They sent her here for fracture  Family was not available or reachable at this time  DNR for now        Hyperlipidemia- (present on admission)   Assessment & Plan    Continue statin        Hypertension- (present on admission)   Assessment & Plan    Continue  antihypertensives            VTE prophylaxis: pharmacologic.    I spent 71 minutes, reviewing the chart, notes, vitals, labs, imaging, ordering labs, evaluating Nena Lamb for assessment, enacting the plan above.  Discussed with ED physician. Discussed with nursing staff. Time was devoted to counseling and coordinating care including review of records, pertinent lab data and studies, as well as discussing diagnostic evaluation and work up, planned therapeutic interventions and future disposition of care. Where indicated, the assessment and plan reflect discussion of patient with consultants, other healthcare providers, family members, and additional research needed to obtain further information in formulating the plan of care for Nena Lamb.

## 2018-03-30 NOTE — OR NURSING
1130  Patient admitted with low BP; medicated with ephedrine as ordered with BP maintaining greater than 90 systolic throughout remainder of PACU stay.  Patient able to move toes and ankles, slight movement to knees, by discharge from PACU.  No complaints of pain.  Incontinent of UA, large amount, X 1  Confused, speaks clearly about immediate experience.  Follows commands except takes off O2 cannula frequently, cut prongs to facilitate the use of O2 cannula.  Having some success with this process in keeping the oxygen in place

## 2018-03-30 NOTE — OR SURGEON
Immediate Post OP Note    PreOp Diagnosis: displaced femoral neck fracture    PostOp Diagnosis: same    Procedure(s):  HIP HEMIARTHROPLASTY - Wound Class: Clean    Surgeon(s):  Zurdo Dowd M.D. surgeon  Lupillo Murry M.D.    Anesthesiologist/Type of Anesthesia:  Anesthesiologist: Rosas Gutiérrez M.D./Spinal    Surgical Staff:  Circulator: Dima Milligan R.N.  Limb Huerta: Alondra Man  Scrub Person: Natalee Mcclellan    Specimens removed if any:  * No specimens in log *    Estimated Blood Loss: 100cc's    Findings: as described    Complications: none        3/30/2018 9:08 AM Zurdo Dowd M.D.

## 2018-03-30 NOTE — PROGRESS NOTES
2 RN skin check was completed. Pts Heels were red and blanching. Pts right foot had red between the 3/4 and 4/5 toes that were blanching.

## 2018-03-30 NOTE — ED NOTES
Med rec complete per MAR from pt's care home  Allergies reviewed - NKDA  No recent ABX noted on MAR  Pt not able to provide any information regarding her medications

## 2018-03-30 NOTE — ASSESSMENT & PLAN NOTE
Urinalysis positive and culture returned with Proteus. Poor historian so unable to tell if she is symptomatic  - Antibiotics ×3 days

## 2018-03-31 PROBLEM — R73.9 HYPERGLYCEMIA: Status: ACTIVE | Noted: 2018-03-31

## 2018-03-31 PROBLEM — D62 ACUTE BLOOD LOSS ANEMIA: Status: ACTIVE | Noted: 2018-03-31

## 2018-03-31 LAB
ANION GAP SERPL CALC-SCNC: 5 MMOL/L (ref 0–11.9)
BACTERIA UR CULT: ABNORMAL
BACTERIA UR CULT: ABNORMAL
BUN SERPL-MCNC: 15 MG/DL (ref 8–22)
CALCIUM SERPL-MCNC: 8.3 MG/DL (ref 8.5–10.5)
CHLORIDE SERPL-SCNC: 105 MMOL/L (ref 96–112)
CO2 SERPL-SCNC: 25 MMOL/L (ref 20–33)
CREAT SERPL-MCNC: 0.94 MG/DL (ref 0.5–1.4)
ERYTHROCYTE [DISTWIDTH] IN BLOOD BY AUTOMATED COUNT: 46.5 FL (ref 35.9–50)
GLUCOSE SERPL-MCNC: 158 MG/DL (ref 65–99)
HCT VFR BLD AUTO: 29.2 % (ref 37–47)
HGB BLD-MCNC: 9.2 G/DL (ref 12–16)
MCH RBC QN AUTO: 26.8 PG (ref 27–33)
MCHC RBC AUTO-ENTMCNC: 31.5 G/DL (ref 33.6–35)
MCV RBC AUTO: 85.1 FL (ref 81.4–97.8)
PLATELET # BLD AUTO: 175 K/UL (ref 164–446)
PMV BLD AUTO: 9.7 FL (ref 9–12.9)
POTASSIUM SERPL-SCNC: 3.5 MMOL/L (ref 3.6–5.5)
RBC # BLD AUTO: 3.43 M/UL (ref 4.2–5.4)
SIGNIFICANT IND 70042: ABNORMAL
SITE SITE: ABNORMAL
SODIUM SERPL-SCNC: 135 MMOL/L (ref 135–145)
SOURCE SOURCE: ABNORMAL
WBC # BLD AUTO: 7.1 K/UL (ref 4.8–10.8)

## 2018-03-31 PROCEDURE — 80048 BASIC METABOLIC PNL TOTAL CA: CPT

## 2018-03-31 PROCEDURE — 302146: Performed by: HOSPITALIST

## 2018-03-31 PROCEDURE — 700111 HCHG RX REV CODE 636 W/ 250 OVERRIDE (IP): Performed by: ORTHOPAEDIC SURGERY

## 2018-03-31 PROCEDURE — 36415 COLL VENOUS BLD VENIPUNCTURE: CPT

## 2018-03-31 PROCEDURE — 85027 COMPLETE CBC AUTOMATED: CPT

## 2018-03-31 PROCEDURE — 770006 HCHG ROOM/CARE - MED/SURG/GYN SEMI*

## 2018-03-31 PROCEDURE — 700102 HCHG RX REV CODE 250 W/ 637 OVERRIDE(OP): Performed by: INTERNAL MEDICINE

## 2018-03-31 PROCEDURE — 700111 HCHG RX REV CODE 636 W/ 250 OVERRIDE (IP): Performed by: INTERNAL MEDICINE

## 2018-03-31 PROCEDURE — 700102 HCHG RX REV CODE 250 W/ 637 OVERRIDE(OP): Performed by: HOSPITALIST

## 2018-03-31 PROCEDURE — A9270 NON-COVERED ITEM OR SERVICE: HCPCS | Performed by: HOSPITALIST

## 2018-03-31 PROCEDURE — A9270 NON-COVERED ITEM OR SERVICE: HCPCS | Performed by: INTERNAL MEDICINE

## 2018-03-31 PROCEDURE — 700105 HCHG RX REV CODE 258

## 2018-03-31 PROCEDURE — 99232 SBSQ HOSP IP/OBS MODERATE 35: CPT | Mod: GW | Performed by: HOSPITALIST

## 2018-03-31 RX ORDER — SODIUM CHLORIDE 9 MG/ML
INJECTION, SOLUTION INTRAVENOUS
Status: COMPLETED
Start: 2018-03-31 | End: 2018-03-31

## 2018-03-31 RX ADMIN — ENOXAPARIN SODIUM 40 MG: 100 INJECTION SUBCUTANEOUS at 08:57

## 2018-03-31 RX ADMIN — SODIUM CHLORIDE 500 ML: 9 INJECTION, SOLUTION INTRAVENOUS at 05:49

## 2018-03-31 RX ADMIN — LACTOBACILLUS ACIDOPHILUS / LACTOBACILLUS BULGARICUS 1 PACKET: 100 MILLION CFU STRENGTH GRANULES at 08:57

## 2018-03-31 RX ADMIN — ACETAMINOPHEN 650 MG: 325 TABLET, FILM COATED ORAL at 00:33

## 2018-03-31 RX ADMIN — CEFAZOLIN SODIUM 1 G: 1 INJECTION, SOLUTION INTRAVENOUS at 00:32

## 2018-03-31 RX ADMIN — ASPIRIN 81 MG: 81 TABLET, COATED ORAL at 08:57

## 2018-03-31 RX ADMIN — ATORVASTATIN CALCIUM 20 MG: 20 TABLET, FILM COATED ORAL at 19:45

## 2018-03-31 RX ADMIN — STANDARDIZED SENNA CONCENTRATE AND DOCUSATE SODIUM 2 TABLET: 8.6; 5 TABLET, FILM COATED ORAL at 08:57

## 2018-03-31 RX ADMIN — Medication 1000 MCG: at 08:56

## 2018-03-31 RX ADMIN — STANDARDIZED SENNA CONCENTRATE AND DOCUSATE SODIUM 2 TABLET: 8.6; 5 TABLET, FILM COATED ORAL at 19:45

## 2018-03-31 RX ADMIN — LACTOBACILLUS ACIDOPHILUS / LACTOBACILLUS BULGARICUS 1 PACKET: 100 MILLION CFU STRENGTH GRANULES at 11:30

## 2018-03-31 RX ADMIN — MEMANTINE 5 MG: 10 TABLET ORAL at 19:46

## 2018-03-31 RX ADMIN — DONEPEZIL HYDROCHLORIDE 22.5 MG: 5 TABLET, FILM COATED ORAL at 08:56

## 2018-03-31 RX ADMIN — QUETIAPINE FUMARATE 50 MG: 25 TABLET ORAL at 19:45

## 2018-03-31 RX ADMIN — CETIRIZINE HYDROCHLORIDE 10 MG: 10 TABLET, FILM COATED ORAL at 08:56

## 2018-03-31 RX ADMIN — MEMANTINE 5 MG: 10 TABLET ORAL at 08:57

## 2018-03-31 RX ADMIN — OMEPRAZOLE 20 MG: 20 CAPSULE, DELAYED RELEASE ORAL at 08:57

## 2018-03-31 RX ADMIN — LACTOBACILLUS ACIDOPHILUS / LACTOBACILLUS BULGARICUS 1 PACKET: 100 MILLION CFU STRENGTH GRANULES at 17:30

## 2018-03-31 RX ADMIN — CEFTRIAXONE 2 G: 2 INJECTION, POWDER, FOR SOLUTION INTRAMUSCULAR; INTRAVENOUS at 08:56

## 2018-03-31 RX ADMIN — POTASSIUM CHLORIDE 20 MEQ: 1500 TABLET, EXTENDED RELEASE ORAL at 08:57

## 2018-03-31 ASSESSMENT — PAIN SCALES - GENERAL
PAINLEVEL_OUTOF10: 0

## 2018-03-31 NOTE — PROGRESS NOTES
Hourly rounding, call bell within reach. Patient educated about plan of care, pain management, call bell use, and mobility. Patient able to get up to chair with one to two person assist. At rest patient calm and resting, some discomfort with movement. Patient turned q2h while in bed. Incontinent of urine, changed prn. Dressing to left hip dry and intact. Patient oriented to self only. Continues to require 1-2lpm of oxygen, will try to wean as tolerated. Unable to do IS today, due to cognitive abilities. Mepilex placed to sacrum

## 2018-03-31 NOTE — CONSULTS
Reason for PC Consult: Advance Care Planning    Consulted by: Dr Ochoa.     Assessment:  General: 77 yr old female admitted on 3/29/18 for Hip fx. Hx of DM, Insomnia, Dementia,  Hyperlipidemia, HTN. Pt has advanced dementia, oriented to self, very pleasant.     Dyspnea: No, 92% on 1 liter NC.  Last BM: None charted at this time.    Pain: No, pt denied.  Depression: Mood appropriate for situation.    Dementia: Yes;  6, E    Spiritual:  Is Congregation or spirituality important for coping with this illness? No, per daughter.   Has a  or spiritual provider visit been requested? No    Palliative Performance Scale: 50%    Advance Directive: None   DPOA: No , NOK Melissa Wu [phone], daughter.    POLST: No-      Code Status: DNR      Outcome:  Met the pt at the [facility]. Pt very pleasant, resting comfortably. Called and spoke with pts daughterMelissa. Introduced self and role of Palliative Care Services. Daughter has POC in place. Pt has been on service with [facility] and resides at [facility].  has been in contact with the  and the Hospice agency with the plan for the pt to return to her  with hospice services.  stated that she spoke with Mary at the hospice agency who stated that they would keep the pt on service unless she had to DC to a skilled before being able to return to the .  Updated daughter on pts condition and let her know that we will speak with the MD and Unit SW for DC plan.     Updated: MD, Unit SW.     Plan: Pt to DC back to [facility] [phone] with [facility] [phone] in place.    Recommendations: I do not recommend an ethics or hospice consult at this time because as the pt is currenlty on service with a Hospice agency.    Thank you for allowing Palliative Care to participate in this patient's care. Please feel free to call [phone] with any questions or concerns.

## 2018-03-31 NOTE — PROGRESS NOTES
Pain controlled  No complaints    Vitals:    03/31/18 0000 03/31/18 0009 03/31/18 0415 03/31/18 0800   BP: 135/55  102/58 127/60   Pulse: (!) 116 (!) 110 (!) 101 96   Resp: 18 18 17   Temp: (!) 38.2 °C (100.7 °F)  37.5 °C (99.5 °F) 36.6 °C (97.9 °F)   SpO2: 93%  95% 92%   Weight:       Height:         LLE:  Dressing c/d/i  F/e ankle, toes  Foot w/w/p    POD#1 s/p L hip paulino    - WBAT, posterior hip precautions  - PT/OT  - Lovenox  - Dispo planning

## 2018-03-31 NOTE — PALLIATIVE CARE
Palliative Care follow-up  Received referral for consult. Pt was on Hospice for Alzheimer's dementia. Pt currently asleep with no family at the bedside. Pt discussed with BS RN. Called pts daughter and left voice message requesting call back to discuss POC, contact number provided.     Plan: Will discuss POC with pts daughter and update MD.     Thank you for allowing Palliative Care to participate in this patient's care. Please feel free to call [phone] with any questions or concerns.

## 2018-03-31 NOTE — CARE PLAN
Problem: Safety  Goal: Will remain free from falls  Patient's room located near nurses station.  Bed alarm is in use.  Frequent monitoring.

## 2018-03-31 NOTE — CARE PLAN
Problem: Skin Integrity  Goal: Risk for impaired skin integrity will decrease  Waffle mattress in use.  Patient turned every two hours. Barrier cream applied to skin after being cleaned.

## 2018-04-01 LAB
ANION GAP SERPL CALC-SCNC: 8 MMOL/L (ref 0–11.9)
BUN SERPL-MCNC: 14 MG/DL (ref 8–22)
CALCIUM SERPL-MCNC: 8.2 MG/DL (ref 8.5–10.5)
CHLORIDE SERPL-SCNC: 103 MMOL/L (ref 96–112)
CO2 SERPL-SCNC: 25 MMOL/L (ref 20–33)
CREAT SERPL-MCNC: 0.67 MG/DL (ref 0.5–1.4)
ERYTHROCYTE [DISTWIDTH] IN BLOOD BY AUTOMATED COUNT: 46.3 FL (ref 35.9–50)
EST. AVERAGE GLUCOSE BLD GHB EST-MCNC: 151 MG/DL
GLUCOSE SERPL-MCNC: 142 MG/DL (ref 65–99)
HBA1C MFR BLD: 6.9 % (ref 0–5.6)
HCT VFR BLD AUTO: 29.3 % (ref 37–47)
HGB BLD-MCNC: 9.2 G/DL (ref 12–16)
MAGNESIUM SERPL-MCNC: 1.4 MG/DL (ref 1.5–2.5)
MCH RBC QN AUTO: 26.6 PG (ref 27–33)
MCHC RBC AUTO-ENTMCNC: 31.4 G/DL (ref 33.6–35)
MCV RBC AUTO: 84.7 FL (ref 81.4–97.8)
PLATELET # BLD AUTO: 184 K/UL (ref 164–446)
PMV BLD AUTO: 10 FL (ref 9–12.9)
POTASSIUM SERPL-SCNC: 3.2 MMOL/L (ref 3.6–5.5)
RBC # BLD AUTO: 3.46 M/UL (ref 4.2–5.4)
SODIUM SERPL-SCNC: 136 MMOL/L (ref 135–145)
WBC # BLD AUTO: 8.1 K/UL (ref 4.8–10.8)

## 2018-04-01 PROCEDURE — 700102 HCHG RX REV CODE 250 W/ 637 OVERRIDE(OP): Performed by: HOSPITALIST

## 2018-04-01 PROCEDURE — 83735 ASSAY OF MAGNESIUM: CPT

## 2018-04-01 PROCEDURE — 770006 HCHG ROOM/CARE - MED/SURG/GYN SEMI*

## 2018-04-01 PROCEDURE — 99232 SBSQ HOSP IP/OBS MODERATE 35: CPT | Mod: GW | Performed by: HOSPITALIST

## 2018-04-01 PROCEDURE — 302146: Performed by: HOSPITALIST

## 2018-04-01 PROCEDURE — 700111 HCHG RX REV CODE 636 W/ 250 OVERRIDE (IP): Performed by: HOSPITALIST

## 2018-04-01 PROCEDURE — A9270 NON-COVERED ITEM OR SERVICE: HCPCS | Performed by: HOSPITALIST

## 2018-04-01 PROCEDURE — 83036 HEMOGLOBIN GLYCOSYLATED A1C: CPT

## 2018-04-01 PROCEDURE — A9270 NON-COVERED ITEM OR SERVICE: HCPCS | Performed by: INTERNAL MEDICINE

## 2018-04-01 PROCEDURE — 302255 BARRIER CREAM MOISTURE BAZA PROTECT (ZINC) 5OZ: Performed by: HOSPITALIST

## 2018-04-01 PROCEDURE — 80048 BASIC METABOLIC PNL TOTAL CA: CPT

## 2018-04-01 PROCEDURE — 85027 COMPLETE CBC AUTOMATED: CPT

## 2018-04-01 PROCEDURE — 36415 COLL VENOUS BLD VENIPUNCTURE: CPT

## 2018-04-01 PROCEDURE — 700102 HCHG RX REV CODE 250 W/ 637 OVERRIDE(OP): Performed by: INTERNAL MEDICINE

## 2018-04-01 PROCEDURE — 700111 HCHG RX REV CODE 636 W/ 250 OVERRIDE (IP): Performed by: INTERNAL MEDICINE

## 2018-04-01 RX ORDER — MAGNESIUM SULFATE HEPTAHYDRATE 40 MG/ML
4 INJECTION, SOLUTION INTRAVENOUS ONCE
Status: COMPLETED | OUTPATIENT
Start: 2018-04-01 | End: 2018-04-01

## 2018-04-01 RX ORDER — POTASSIUM CHLORIDE 20 MEQ/1
40 TABLET, EXTENDED RELEASE ORAL ONCE
Status: COMPLETED | OUTPATIENT
Start: 2018-04-01 | End: 2018-04-01

## 2018-04-01 RX ADMIN — QUETIAPINE FUMARATE 50 MG: 25 TABLET ORAL at 21:27

## 2018-04-01 RX ADMIN — LACTOBACILLUS ACIDOPHILUS / LACTOBACILLUS BULGARICUS 1 PACKET: 100 MILLION CFU STRENGTH GRANULES at 08:43

## 2018-04-01 RX ADMIN — STANDARDIZED SENNA CONCENTRATE AND DOCUSATE SODIUM 2 TABLET: 8.6; 5 TABLET, FILM COATED ORAL at 08:47

## 2018-04-01 RX ADMIN — Medication 1000 MCG: at 08:41

## 2018-04-01 RX ADMIN — OMEPRAZOLE 20 MG: 20 CAPSULE, DELAYED RELEASE ORAL at 08:41

## 2018-04-01 RX ADMIN — MEMANTINE 5 MG: 10 TABLET ORAL at 08:42

## 2018-04-01 RX ADMIN — MAGNESIUM SULFATE IN WATER 4 G: 40 INJECTION, SOLUTION INTRAVENOUS at 08:41

## 2018-04-01 RX ADMIN — OXYCODONE HYDROCHLORIDE 5 MG: 5 TABLET ORAL at 21:28

## 2018-04-01 RX ADMIN — POTASSIUM CHLORIDE 20 MEQ: 1500 TABLET, EXTENDED RELEASE ORAL at 08:43

## 2018-04-01 RX ADMIN — POTASSIUM CHLORIDE 40 MEQ: 1500 TABLET, EXTENDED RELEASE ORAL at 08:42

## 2018-04-01 RX ADMIN — LACTOBACILLUS ACIDOPHILUS / LACTOBACILLUS BULGARICUS 1 PACKET: 100 MILLION CFU STRENGTH GRANULES at 17:09

## 2018-04-01 RX ADMIN — CETIRIZINE HYDROCHLORIDE 10 MG: 10 TABLET, FILM COATED ORAL at 08:42

## 2018-04-01 RX ADMIN — ASPIRIN 81 MG: 81 TABLET, COATED ORAL at 08:42

## 2018-04-01 RX ADMIN — CEFTRIAXONE 2 G: 2 INJECTION, POWDER, FOR SOLUTION INTRAMUSCULAR; INTRAVENOUS at 08:47

## 2018-04-01 RX ADMIN — ENOXAPARIN SODIUM 40 MG: 100 INJECTION SUBCUTANEOUS at 08:41

## 2018-04-01 RX ADMIN — ATORVASTATIN CALCIUM 20 MG: 20 TABLET, FILM COATED ORAL at 21:28

## 2018-04-01 RX ADMIN — DONEPEZIL HYDROCHLORIDE 22.5 MG: 5 TABLET, FILM COATED ORAL at 08:42

## 2018-04-01 RX ADMIN — LACTOBACILLUS ACIDOPHILUS / LACTOBACILLUS BULGARICUS 1 PACKET: 100 MILLION CFU STRENGTH GRANULES at 11:30

## 2018-04-01 RX ADMIN — MEMANTINE 5 MG: 10 TABLET ORAL at 21:27

## 2018-04-01 ASSESSMENT — PAIN SCALES - GENERAL
PAINLEVEL_OUTOF10: 0

## 2018-04-01 NOTE — ASSESSMENT & PLAN NOTE
Hemoglobin 12.2 on admission, down to 9.2 after surgery. No evidence of active bleeding.  - repeat in AM  - transfuse if hemoglobin < 7

## 2018-04-01 NOTE — PROGRESS NOTES
"Patient seen and examined    Blood pressure 123/54, pulse 100, temperature 37.4 °C (99.4 °F), resp. rate 18, height 1.626 m (5' 4\"), weight 63.5 kg (140 lb), SpO2 96 %, not currently breastfeeding.    Recent Labs      03/30/18   0238  03/31/18   0421  04/01/18   0402   WBC  12.0*  7.1  8.1   RBC  4.22  3.43*  3.46*   HEMOGLOBIN  11.2*  9.2*  9.2*   HEMATOCRIT  34.8*  29.2*  29.3*   MCV  82.5  85.1  84.7   MCH  26.5*  26.8*  26.6*   MCHC  32.2*  31.5*  31.4*   RDW  44.2  46.5  46.3   PLATELETCT  290  175  184   MPV  9.5  9.7  10.0       No acute distress  Dressing clean dry and intact  Neurovascularly intact    POD#2    Plan:  DVT Prophylaxis- TEDS/SCDs  Weight Bearing Status-WBAT  PT/OT  Antibiotics: None  Case Coordination          "

## 2018-04-01 NOTE — PROGRESS NOTES
[facility] Hospitalist Progress Note    Date of Service: 3/31/2018    Chief Complaint  77 y.o. female admitted 3/29/2018 with an level fall resulting in hip pain.    Interval Problem Update  Doing well, denies pain. Pleasantly with dementia. No acute overnight events.    Consultants/Specialty  Ortho    Disposition  Back to group home with hospice in the next 24-48 hours.        Review of Systems   Unable to perform ROS: Dementia      Physical Exam  Laboratory/Imaging   Hemodynamics  Temp (24hrs), Av.3 °C (99.1 °F), Min:36.6 °C (97.9 °F), Max:38.2 °C (100.7 °F)   Temperature: 37.6 °C (99.6 °F)  Pulse  Av.5  Min: 96  Max: 117   Blood Pressure : 150/60      Respiratory      Respiration: 18, Pulse Oximetry: 92 %        RUL Breath Sounds: Clear, RML Breath Sounds: Clear, RLL Breath Sounds: Diminished, RANDY Breath Sounds: Clear, LLL Breath Sounds: Diminished    Fluids    Intake/Output Summary (Last 24 hours) at 18 2157  Last data filed at 18 0051   Gross per 24 hour   Intake              160 ml   Output                0 ml   Net              160 ml       Nutrition  Orders Placed This Encounter   Procedures   • DIET ORDER     Standing Status:   Standing     Number of Occurrences:   1     Order Specific Question:   Diet:     Answer:   Regular [1]     Physical Exam   Constitutional: She is oriented to person, place, and time. She appears well-developed. No distress.   HENT:   Head: Normocephalic.   Mouth/Throat: Oropharynx is clear and moist.   Eyes: EOM are normal. No scleral icterus.   Neck: Normal range of motion. Neck supple.   Cardiovascular: Regular rhythm and intact distal pulses.  Tachycardia present.    Pulmonary/Chest: Breath sounds normal. No respiratory distress. She has no rales.   Abdominal: Soft. She exhibits no distension. There is no tenderness.   Musculoskeletal: She exhibits tenderness (hip). She exhibits no edema.   Lymphadenopathy:     She has no cervical adenopathy.   Neurological: She  is alert and oriented to person, place, and time.   Skin: Skin is warm and dry.   Psychiatric: She has a normal mood and affect. Cognition and memory are impaired.   Vitals reviewed.      Recent Labs      03/29/18   1530  03/30/18   0238  03/31/18   0421   WBC  12.4*  12.0*  7.1   RBC  4.40  4.22  3.43*   HEMOGLOBIN  12.2  11.2*  9.2*   HEMATOCRIT  37.7  34.8*  29.2*   MCV  85.7  82.5  85.1   MCH  27.7  26.5*  26.8*   MCHC  32.4*  32.2*  31.5*   RDW  46.1  44.2  46.5   PLATELETCT  348  290  175   MPV  9.4  9.5  9.7     Recent Labs      03/29/18   1530  03/30/18   0238  03/31/18   0421   SODIUM  136  133*  135   POTASSIUM  4.1  3.8  3.5*   CHLORIDE  103  102  105   CO2  22  22  25   GLUCOSE  165*  192*  158*   BUN  26*  19  15   CREATININE  0.97  0.78  0.94   CALCIUM  9.8  9.1  8.3*     Recent Labs      03/29/18   1530   APTT  28.1   INR  1.04                  Assessment/Plan     * Displaced fracture of left femoral neck (CMS-[facility])- (present on admission)   Assessment & Plan    From hospice for advanced dementia. Reportedly chair and bed bound prior to her fall, can only help with transfers. Found to have femoral neck fracture.  - Admitted for ortho consultation and pain control  - Status post hemiarthroplasty on 3/30  - PT/OT, will likely need continued therapies on discharge        Urinary tract infection- (present on admission)   Assessment & Plan    Urinalysis positive and culture returned with Proteus. Poor historian so unable to tell if she is symptomatic  - Antibiotics ×3 days         Hypertension- (present on admission)   Assessment & Plan    Blood pressure borderline low  - hold home amlodipine        Hyperglycemia- (present on admission)   Assessment & Plan    Noted to be hyperglycemic on admission.  - check A1C        Acute blood loss anemia   Assessment & Plan    Hemoglobin 12.2 on admission, down to 9.2 after surgery. No evidence of active bleeding.  - repeat in AM  - transfuse if hemoglobin < 7         Advanced dementia- (present on admission)   Assessment & Plan    Pleasant. No behavioral disturbances.   - Continue Namenda and Aricept  - Frequent reorientation as needed        Hypokalemia- (present on admission)   Assessment & Plan    Monitor and replete  Check magnesium in AM        Hyperlipidemia- (present on admission)   Assessment & Plan    Continue statin          Quality-Core Measures   Reviewed items::  Labs reviewed and Medications reviewed  Almaguer catheter::  No Almaguer  DVT prophylaxis pharmacological::  Enoxaparin (Lovenox)  DVT prophylaxis - mechanical:  SCDs  Ulcer Prophylaxis::  Yes

## 2018-04-01 NOTE — ASSESSMENT & PLAN NOTE
Noted to be hyperglycemic on admission. Mildly elevated A1C at 6.9% but given age and comorbidities would not want control any tighter.  - monitor intermittently

## 2018-04-01 NOTE — CARE PLAN
Problem: Safety  Goal: Will remain free from falls  Outcome: PROGRESSING AS EXPECTED  Treaded socks in place, bed in the lowest position, bed alarm on, call light and belongings within reach, pt call for assistance appropriately    Problem: Skin Integrity  Goal: Risk for impaired skin integrity will decrease  Outcome: PROGRESSING AS EXPECTED  q2 turns, clyde risk assessment, applied barrier cream

## 2018-04-01 NOTE — PROGRESS NOTES
Report received. Assumed care. Pt in bed awake. A/O x0. VSS. No body language or behavior observed to indicate pt in pain.  No SOB.  Patient is comfortable and calm.  Patient's O2 saturations occasionally drops lower than 90 (towards 85-88).  Placed Nasal cannula but patient continues to remove it despite education.  Instituted q2 turns as well as eder changes to coincide incontinence. Assessment complete. Call light and belongings within reach. Bed alarm on. Bed in the lowest position. Treaded socks in place. Hourly rounding in progress. Will continue to monitor .

## 2018-04-02 PROBLEM — S72.002A DISPLACED FRACTURE OF LEFT FEMORAL NECK (HCC): Status: RESOLVED | Noted: 2018-03-29 | Resolved: 2018-04-02

## 2018-04-02 PROBLEM — D62 ACUTE BLOOD LOSS ANEMIA: Status: RESOLVED | Noted: 2018-03-31 | Resolved: 2018-04-02

## 2018-04-02 LAB
ANION GAP SERPL CALC-SCNC: 8 MMOL/L (ref 0–11.9)
BUN SERPL-MCNC: 20 MG/DL (ref 8–22)
CALCIUM SERPL-MCNC: 8 MG/DL (ref 8.5–10.5)
CHLORIDE SERPL-SCNC: 105 MMOL/L (ref 96–112)
CO2 SERPL-SCNC: 24 MMOL/L (ref 20–33)
CREAT SERPL-MCNC: 0.77 MG/DL (ref 0.5–1.4)
GLUCOSE SERPL-MCNC: 135 MG/DL (ref 65–99)
HCT VFR BLD AUTO: 29.5 % (ref 37–47)
HGB BLD-MCNC: 9.4 G/DL (ref 12–16)
MAGNESIUM SERPL-MCNC: 2 MG/DL (ref 1.5–2.5)
POTASSIUM SERPL-SCNC: 3.7 MMOL/L (ref 3.6–5.5)
SODIUM SERPL-SCNC: 137 MMOL/L (ref 135–145)

## 2018-04-02 PROCEDURE — 700111 HCHG RX REV CODE 636 W/ 250 OVERRIDE (IP): Performed by: INTERNAL MEDICINE

## 2018-04-02 PROCEDURE — 85014 HEMATOCRIT: CPT

## 2018-04-02 PROCEDURE — G8987 SELF CARE CURRENT STATUS: HCPCS | Mod: CL

## 2018-04-02 PROCEDURE — G8988 SELF CARE GOAL STATUS: HCPCS | Mod: CJ

## 2018-04-02 PROCEDURE — A9270 NON-COVERED ITEM OR SERVICE: HCPCS | Performed by: HOSPITALIST

## 2018-04-02 PROCEDURE — 83735 ASSAY OF MAGNESIUM: CPT

## 2018-04-02 PROCEDURE — 99233 SBSQ HOSP IP/OBS HIGH 50: CPT | Mod: GW | Performed by: HOSPITALIST

## 2018-04-02 PROCEDURE — A9270 NON-COVERED ITEM OR SERVICE: HCPCS | Performed by: INTERNAL MEDICINE

## 2018-04-02 PROCEDURE — 770006 HCHG ROOM/CARE - MED/SURG/GYN SEMI*

## 2018-04-02 PROCEDURE — 85018 HEMOGLOBIN: CPT

## 2018-04-02 PROCEDURE — G8978 MOBILITY CURRENT STATUS: HCPCS | Mod: CL

## 2018-04-02 PROCEDURE — 80048 BASIC METABOLIC PNL TOTAL CA: CPT

## 2018-04-02 PROCEDURE — 97162 PT EVAL MOD COMPLEX 30 MIN: CPT

## 2018-04-02 PROCEDURE — G8979 MOBILITY GOAL STATUS: HCPCS | Mod: CJ

## 2018-04-02 PROCEDURE — 700102 HCHG RX REV CODE 250 W/ 637 OVERRIDE(OP): Performed by: HOSPITALIST

## 2018-04-02 PROCEDURE — 36415 COLL VENOUS BLD VENIPUNCTURE: CPT

## 2018-04-02 PROCEDURE — 700102 HCHG RX REV CODE 250 W/ 637 OVERRIDE(OP): Performed by: INTERNAL MEDICINE

## 2018-04-02 PROCEDURE — 97166 OT EVAL MOD COMPLEX 45 MIN: CPT

## 2018-04-02 RX ADMIN — POTASSIUM CHLORIDE 20 MEQ: 1500 TABLET, EXTENDED RELEASE ORAL at 08:22

## 2018-04-02 RX ADMIN — CEFTRIAXONE 2 G: 2 INJECTION, POWDER, FOR SOLUTION INTRAMUSCULAR; INTRAVENOUS at 08:24

## 2018-04-02 RX ADMIN — LACTOBACILLUS ACIDOPHILUS / LACTOBACILLUS BULGARICUS 1 PACKET: 100 MILLION CFU STRENGTH GRANULES at 08:18

## 2018-04-02 RX ADMIN — STANDARDIZED SENNA CONCENTRATE AND DOCUSATE SODIUM 2 TABLET: 8.6; 5 TABLET, FILM COATED ORAL at 08:22

## 2018-04-02 RX ADMIN — LACTOBACILLUS ACIDOPHILUS / LACTOBACILLUS BULGARICUS 1 PACKET: 100 MILLION CFU STRENGTH GRANULES at 17:13

## 2018-04-02 RX ADMIN — OMEPRAZOLE 20 MG: 20 CAPSULE, DELAYED RELEASE ORAL at 08:22

## 2018-04-02 RX ADMIN — QUETIAPINE FUMARATE 50 MG: 25 TABLET ORAL at 20:41

## 2018-04-02 RX ADMIN — STANDARDIZED SENNA CONCENTRATE AND DOCUSATE SODIUM 2 TABLET: 8.6; 5 TABLET, FILM COATED ORAL at 20:40

## 2018-04-02 RX ADMIN — DONEPEZIL HYDROCHLORIDE 22.5 MG: 5 TABLET, FILM COATED ORAL at 08:22

## 2018-04-02 RX ADMIN — ASPIRIN 81 MG: 81 TABLET, COATED ORAL at 08:22

## 2018-04-02 RX ADMIN — MEMANTINE 5 MG: 10 TABLET ORAL at 08:22

## 2018-04-02 RX ADMIN — Medication 1000 MCG: at 08:22

## 2018-04-02 RX ADMIN — CETIRIZINE HYDROCHLORIDE 10 MG: 10 TABLET, FILM COATED ORAL at 08:22

## 2018-04-02 RX ADMIN — ATORVASTATIN CALCIUM 20 MG: 20 TABLET, FILM COATED ORAL at 20:40

## 2018-04-02 RX ADMIN — MEMANTINE 5 MG: 10 TABLET ORAL at 20:42

## 2018-04-02 RX ADMIN — ENOXAPARIN SODIUM 40 MG: 100 INJECTION SUBCUTANEOUS at 08:22

## 2018-04-02 RX ADMIN — BISACODYL 10 MG: 10 SUPPOSITORY RECTAL at 08:18

## 2018-04-02 ASSESSMENT — COGNITIVE AND FUNCTIONAL STATUS - GENERAL
DAILY ACTIVITIY SCORE: 12
DRESSING REGULAR UPPER BODY CLOTHING: A LOT
MOVING TO AND FROM BED TO CHAIR: UNABLE
TOILETING: TOTAL
SUGGESTED CMS G CODE MODIFIER DAILY ACTIVITY: CL
SUGGESTED CMS G CODE MODIFIER MOBILITY: CM
EATING MEALS: A LITTLE
PERSONAL GROOMING: A LOT
TURNING FROM BACK TO SIDE WHILE IN FLAT BAD: UNABLE
STANDING UP FROM CHAIR USING ARMS: A LOT
MOVING FROM LYING ON BACK TO SITTING ON SIDE OF FLAT BED: UNABLE
WALKING IN HOSPITAL ROOM: TOTAL
MOBILITY SCORE: 7
CLIMB 3 TO 5 STEPS WITH RAILING: TOTAL
DRESSING REGULAR LOWER BODY CLOTHING: A LOT
HELP NEEDED FOR BATHING: A LOT

## 2018-04-02 ASSESSMENT — PAIN SCALES - GENERAL
PAINLEVEL_OUTOF10: 0
PAINLEVEL_OUTOF10: 0

## 2018-04-02 ASSESSMENT — LIFESTYLE VARIABLES: DO YOU DRINK ALCOHOL: NO

## 2018-04-02 ASSESSMENT — COPD QUESTIONNAIRES
DURING THE PAST 4 WEEKS HOW MUCH DID YOU FEEL SHORT OF BREATH: SOME OF THE TIME
COPD SCREENING SCORE: 4
DO YOU EVER COUGH UP ANY MUCUS OR PHLEGM?: NO/ONLY WITH OCCASIONAL COLDS OR INFECTIONS
HAVE YOU SMOKED AT LEAST 100 CIGARETTES IN YOUR ENTIRE LIFE: NO/DON'T KNOW

## 2018-04-02 ASSESSMENT — ACTIVITIES OF DAILY LIVING (ADL): TOILETING: UNABLE TO DETERMINE AT THIS TIME

## 2018-04-02 ASSESSMENT — GAIT ASSESSMENTS: GAIT LEVEL OF ASSIST: UNABLE TO PARTICIPATE

## 2018-04-02 NOTE — THERAPY
"Occupational Therapy Evaluation completed.   Functional Status:  Max A x2 supine to sit.  Max A LB dressing.  Mod-max A sit to stand.  Max A to pivot to bedside chair.  Plan of Care: Will benefit from Occupational Therapy 3 times per week  Discharge Recommendations:  Equipment: Will Continue to Assess for Equipment Needs. Pt will benefit from post acute therapy, TBD depending on level of care group home can provide.    See \"Rehab Therapy-Acute\" Patient Summary Report for complete documentation.    "

## 2018-04-02 NOTE — PROGRESS NOTES
Patient asleep in chair    Vitals:    04/01/18 1600 04/01/18 2000 04/02/18 0400 04/02/18 0800   BP: 129/73 131/62 114/51 105/53   Pulse: 97 96 85 97   Resp: 18 17 17 16   Temp: 36.5 °C (97.7 °F) 36.7 °C (98.1 °F) 36.4 °C (97.6 °F) 37.4 °C (99.4 °F)   SpO2: 93% 93% 91% 91%   Weight:       Height:         LLE:  Foot w/w/p  Remainder of exam deferred    POD#2 s/p L hip paulino    - WBAT, posterior hip precautions  - PT/OT  - Lovenox  - Dispo planning

## 2018-04-02 NOTE — PROGRESS NOTES
Hourly rounding, call bell within reach. Patient educated about plan of care, pain management, call bell use, and mobility. Patient able to get up to chair with two assist. Turned q2h while in bed. Alert to self only. Cleansed for incontinence PRN. Incision dressing clean and intact.

## 2018-04-02 NOTE — PROGRESS NOTES
[facility] Hospitalist Progress Note    Date of Service: 2018    Chief Complaint  77 y.o. female admitted 3/29/2018 with an level fall resulting in hip pain.    Interval Problem Update  Doing well, denies pain. Pleasantly with dementia. Family at the bedside. No acute overnight events.    Consultants/Specialty  Ortho    Disposition  Anticipate d/c in the next 24-48 hours.  Came from group home with hospice.        Review of Systems   Unable to perform ROS: Dementia      Physical Exam  Laboratory/Imaging   Hemodynamics  Temp (24hrs), Av.2 °C (99 °F), Min:36.5 °C (97.7 °F), Max:37.7 °C (99.9 °F)   Temperature: 36.5 °C (97.7 °F)  Pulse  Av.6  Min: 95  Max: 117   Blood Pressure : 129/73      Respiratory      Respiration: 18, Pulse Oximetry: 93 %        RUL Breath Sounds: Clear, RML Breath Sounds: Clear, RLL Breath Sounds: Diminished, RANDY Breath Sounds: Clear, LLL Breath Sounds: Diminished    Fluids    Intake/Output Summary (Last 24 hours) at 18 1936  Last data filed at 18 0000   Gross per 24 hour   Intake              690 ml   Output                0 ml   Net              690 ml       Nutrition  Orders Placed This Encounter   Procedures   • DIET ORDER     Standing Status:   Standing     Number of Occurrences:   1     Order Specific Question:   Diet:     Answer:   Regular [1]     Physical Exam   Constitutional: She appears well-developed. No distress.   HENT:   Head: Normocephalic.   Mouth/Throat: Oropharynx is clear and moist.   Eyes: EOM are normal. No scleral icterus.   Neck: Normal range of motion. Neck supple.   Cardiovascular: Regular rhythm and intact distal pulses.  Tachycardia present.    Pulmonary/Chest: Breath sounds normal. No respiratory distress. She has no rales.   Abdominal: Soft. She exhibits no distension. There is no tenderness.   Musculoskeletal: She exhibits tenderness (hip). She exhibits no edema.   Lymphadenopathy:     She has no cervical adenopathy.   Neurological: She is  alert.   Skin: Skin is warm and dry.   Psychiatric: She has a normal mood and affect. Cognition and memory are impaired.   Vitals reviewed.      Recent Labs      03/30/18 0238 03/31/18 0421 04/01/18   0402   WBC  12.0*  7.1  8.1   RBC  4.22  3.43*  3.46*   HEMOGLOBIN  11.2*  9.2*  9.2*   HEMATOCRIT  34.8*  29.2*  29.3*   MCV  82.5  85.1  84.7   MCH  26.5*  26.8*  26.6*   MCHC  32.2*  31.5*  31.4*   RDW  44.2  46.5  46.3   PLATELETCT  290  175  184   MPV  9.5  9.7  10.0     Recent Labs      03/30/18 0238 03/31/18 0421 04/01/18   0401   SODIUM  133*  135  136   POTASSIUM  3.8  3.5*  3.2*   CHLORIDE  102  105  103   CO2  22  25  25   GLUCOSE  192*  158*  142*   BUN  19  15  14   CREATININE  0.78  0.94  0.67   CALCIUM  9.1  8.3*  8.2*                      Assessment/Plan     * Displaced fracture of left femoral neck (CMS-[facility])- (present on admission)   Assessment & Plan    From hospice for advanced dementia. Reportedly chair and bed bound prior to her fall, can only help with transfers. Found to have femoral neck fracture.  - ortho following  - pain control  - Status post hemiarthroplasty on 3/30  - PT/OT, will likely need continued therapies on discharge        Urinary tract infection- (present on admission)   Assessment & Plan    Urinalysis positive and culture returned with Proteus. Poor historian so unable to tell if she is symptomatic  - Antibiotics ×3 days         Hypertension- (present on admission)   Assessment & Plan    Blood pressure was borderline low, now normalizing  - resume home amlodipine        Hyperglycemia- (present on admission)   Assessment & Plan    Noted to be hyperglycemic on admission. Mildly elevated A1C at 6.9% but given age and comorbidities would not want control any tighter.  - monitor intermittently        Acute blood loss anemia   Assessment & Plan    Hemoglobin 12.2 on admission, down to 9.2 after surgery. No evidence of active bleeding.  - repeat in AM  - transfuse if  hemoglobin < 7        Advanced dementia- (present on admission)   Assessment & Plan    Pleasant. No behavioral disturbances.   - Continue Namenda and Aricept  - Frequent reorientation as needed        Hypokalemia- (present on admission)   Assessment & Plan    With hypomag.  - Monitor and replete        Hyperlipidemia- (present on admission)   Assessment & Plan    Continue statin          Quality-Core Measures   Reviewed items::  Labs reviewed and Medications reviewed  Almaguer catheter::  No Almaguer  DVT prophylaxis pharmacological::  Enoxaparin (Lovenox)  DVT prophylaxis - mechanical:  SCDs  Ulcer Prophylaxis::  Yes

## 2018-04-02 NOTE — DISCHARGE SUMMARY
Adendum: [patient seen and examined at beside, no acute issues. Patient denies fevers/chills, chest pain, shortness of breath or nausea/vommiting. Patient to be transferred to SNF today       CHIEF COMPLAINT ON ADMISSION  Chief Complaint   Patient presents with   • Hip Pain   • GLF       CODE STATUS  DNR    HPI & HOSPITAL COURSE  This is a 77 y.o. female here with ground level fall and left hip pain. She has advanced dementia, living in a group home with hospice services. At baseline she was nearly bed/wheelchair bound. Orthopedics was consulted when she was found to have a displaced femoral neck fracture. It was decided to move forward with a palliative hemiarthroplasty for pain. She tolerated it well and pain was well controlled with minimal pain medications. Family was very involved and supportive. She had acute blood loss anemia from 11.2 to 9.2 post op. She was trending up at the time of discharge and no evidence of active bleeding.    The patient met 2-midnight criteria for an inpatient stay at the time of discharge.    Therefore, she is discharged in good and stable condition with close outpatient follow-up.    SPECIFIC OUTPATIENT FOLLOW-UP  Follow up as needed through [facility]    DISCHARGE PROBLEM LIST  Principal Problem (Resolved):    Displaced fracture of left femoral neck (CMS-HCC) POA: Yes  Active Problems:    Hypertension POA: Yes    Hyperlipidemia POA: Yes    Advanced dementia POA: Yes    Hyperglycemia POA: Yes  Resolved Problems:    Urinary tract infection POA: Yes    Hypokalemia POA: Yes    Acute blood loss anemia POA: No      FOLLOW UP  [facility] ([facility])  [address], [address]  [facility] [address]  [phone]          MEDICATIONS ON DISCHARGE   Nena Lamb   Home Medication Instructions ELINA:[id number]    Printed on:04/02/18 1538   Medication Information                      aspirin EC (ECOTRIN) 81 MG Tablet Delayed Response  Take 81 mg by mouth every day.              atorvastatin (LIPITOR) 20 MG Tab  TAKE 1 TABLET BY MOUTH ONCE DAILY.             cetirizine (ZYRTEC) 10 MG Tab  Take 10 mg by mouth every day.             Cyanocobalamin (VITAMIN B-12) 1000 MCG Tab  Take 1 Tab by mouth every day.             Donepezil HCl 23 MG Tab  TAKE 1 TABLET BY MOUTH ONCE DAILY.             NAMENDA XR 14 MG CAPSULE SR 24 HR  TAKE (1) CAPSULE BY MOUTH ONCE DAILY.             omeprazole (PRILOSEC) 20 MG delayed-release capsule  TAKE (1) CAPSULE BY MOUTH ONCE DAILY.             Polyethylene Glycol 3350 (PEG 3350) Pack  MIX 1 PACKET INTO 8OZ OF JUICE OR WATER AND DRINK ONCE DAILY.             potassium chloride SA (KDUR) 20 MEQ Tab CR  TAKE (1) TABLET BY MOUTH TWICE DAILY.             quetiapine (SEROQUEL) 50 MG tablet  Take once tablet in the evening             senna-docusate (PERICOLACE OR SENOKOT S) 8.6-50 MG Tab  Take 1 Tab by mouth every day.                 DIET  Orders Placed This Encounter   Procedures   • DIET ORDER     Standing Status:   Standing     Number of Occurrences:   1     Order Specific Question:   Diet:     Answer:   Regular [1]       ACTIVITY  As tolerated.  Weight bearing as tolerated      CONSULTATIONS  Orthopedics    PROCEDURES  CXR-  No acute cardiopulmonary abnormalities are identified.    XR femur-  Acute angulated displaced fracture of the left femoral neck.  No distal femur fracture.    Surgery-  Left hemiarthroplasty     XR pelvis-  Status post left hemiarthroplasty    LABORATORY  Lab Results   Component Value Date/Time    SODIUM 137 04/02/2018 05:24 AM    POTASSIUM 3.7 04/02/2018 05:24 AM    CHLORIDE 105 04/02/2018 05:24 AM    CO2 24 04/02/2018 05:24 AM    GLUCOSE 135 (H) 04/02/2018 05:24 AM    BUN 20 04/02/2018 05:24 AM    CREATININE 0.77 04/02/2018 05:24 AM        Lab Results   Component Value Date/Time    WBC 8.1 04/01/2018 04:02 AM    HEMOGLOBIN 9.4 (L) 04/02/2018 05:24 AM    HEMATOCRIT 29.5 (L) 04/02/2018 05:24 AM    PLATELETCT 184 04/01/2018 04:02 AM         Total time of the discharge process exceeds 32 minutes

## 2018-04-02 NOTE — DISCHARGE PLANNING
Received transportation form. Emailed to transportation, per [place] no transportation for today (4/2) Transportation set up for 4/3 at 1330. Julieth GRAVES notified.

## 2018-04-02 NOTE — THERAPY
"Physical Therapy Evaluation completed.   Bed Mobility:  Supine to Sit: Maximal Assist (x2)  Transfers: Sit to Stand: Moderate Assist  Gait: Level Of Assist: Unable to Participate (2-3 steps bed<> chair) with No Equipment Needed       Plan of Care: Will benefit from Physical Therapy 3 times per week  Discharge Recommendations: Equipment: Will Continue to Assess for Equipment Needs.      Patient is s/p GLF sustaining femoral neck fx now hemiarthroplasty.  Patient lives at a group home and has advanced dementia.  Patient's PLOF was reported as bed / chair bound mostly.  Patient will benefit from continued acute and post acute PT services for mobility status     See \"Rehab Therapy-Acute\" Patient Summary Report for complete documentation.     "

## 2018-04-02 NOTE — DISCHARGE PLANNING
Medical Social Work    Per pts chart, pt needs a Hospice referral. SW spoke with daughter who requested that referrals be sent to [facility]. SW addressed all questions and encouraged follow up as needed. SW sent the choice form to [facility] and confirmed receipt of choice form. SW to monitor response status and follow up with care team.

## 2018-04-02 NOTE — PROGRESS NOTES
Hourly rounding, call bell within reach. Patient educated about plan of care, pain management, call bell use, and mobility. Patient able to get up to chair with two person assist, worked with PT and OT today. Dressing dry and intact to left hip. While in chair patient was sleeping and was difficult to arouse. Vitals were stable, patient was put back to bed and mentation went back to baseline without intervention. Negative stroke screen. Patient also had a large bowel movement today after suppository was given this am. Other than these episodes patient had an uneventful day and is scheduled to be discharged tomorrow. Daughter updated.

## 2018-04-03 VITALS
DIASTOLIC BLOOD PRESSURE: 59 MMHG | TEMPERATURE: 98 F | OXYGEN SATURATION: 93 % | HEIGHT: 64 IN | BODY MASS INDEX: 23.9 KG/M2 | HEART RATE: 75 BPM | WEIGHT: 140 LBS | SYSTOLIC BLOOD PRESSURE: 135 MMHG | RESPIRATION RATE: 15 BRPM

## 2018-04-03 PROCEDURE — A9270 NON-COVERED ITEM OR SERVICE: HCPCS | Performed by: HOSPITALIST

## 2018-04-03 PROCEDURE — A9270 NON-COVERED ITEM OR SERVICE: HCPCS | Performed by: INTERNAL MEDICINE

## 2018-04-03 PROCEDURE — 700111 HCHG RX REV CODE 636 W/ 250 OVERRIDE (IP): Performed by: INTERNAL MEDICINE

## 2018-04-03 PROCEDURE — 700102 HCHG RX REV CODE 250 W/ 637 OVERRIDE(OP): Performed by: HOSPITALIST

## 2018-04-03 PROCEDURE — 700102 HCHG RX REV CODE 250 W/ 637 OVERRIDE(OP): Performed by: INTERNAL MEDICINE

## 2018-04-03 PROCEDURE — 99239 HOSP IP/OBS DSCHRG MGMT >30: CPT | Mod: GW | Performed by: HOSPITALIST

## 2018-04-03 RX ADMIN — Medication 1000 MCG: at 09:31

## 2018-04-03 RX ADMIN — LACTOBACILLUS ACIDOPHILUS / LACTOBACILLUS BULGARICUS 1 PACKET: 100 MILLION CFU STRENGTH GRANULES at 09:33

## 2018-04-03 RX ADMIN — ENOXAPARIN SODIUM 40 MG: 100 INJECTION SUBCUTANEOUS at 09:32

## 2018-04-03 RX ADMIN — OMEPRAZOLE 20 MG: 20 CAPSULE, DELAYED RELEASE ORAL at 09:32

## 2018-04-03 RX ADMIN — POTASSIUM CHLORIDE 20 MEQ: 1500 TABLET, EXTENDED RELEASE ORAL at 09:32

## 2018-04-03 RX ADMIN — ASPIRIN 81 MG: 81 TABLET, COATED ORAL at 09:32

## 2018-04-03 RX ADMIN — MEMANTINE 5 MG: 10 TABLET ORAL at 09:32

## 2018-04-03 RX ADMIN — CEFTRIAXONE 2 G: 2 INJECTION, POWDER, FOR SOLUTION INTRAMUSCULAR; INTRAVENOUS at 09:32

## 2018-04-03 RX ADMIN — DONEPEZIL HYDROCHLORIDE 22.5 MG: 5 TABLET, FILM COATED ORAL at 09:30

## 2018-04-03 RX ADMIN — CETIRIZINE HYDROCHLORIDE 10 MG: 10 TABLET, FILM COATED ORAL at 09:33

## 2018-04-03 ASSESSMENT — PAIN SCALES - GENERAL: PAINLEVEL_OUTOF10: 0

## 2018-04-03 NOTE — CARE PLAN
Problem: Venous Thromboembolism (VTW)/Deep Vein Thrombosis (DVT) Prevention:  Goal: Patient will participate in Venous Thrombosis (VTE)/Deep Vein Thrombosis (DVT)Prevention Measures    Intervention: Ensure patient wears graduated elastic stockings (BESS hose) and/or SCDs, if ordered, when in bed or chair (Remove at least once per shift for skin check)  SCDs on

## 2018-04-03 NOTE — PROGRESS NOTES
Hourly rounding, call bell within reach. Patient educated about plan of care, pain management, call bell use, and mobility. Patient able to get up to chair with two assist. Dressing changed to left hip, incision approximated and free of signs of infection. Patient ready for d/c back to group home per md. Discharge instruction reviewed with daughter, , over the phone, verbalized understanding. IV removed. Report called. Transported via medical transportation.

## 2018-04-03 NOTE — DISCHARGE INSTRUCTIONS
Discharge Instructions    Discharged to group home by medical transportation with escort. Discharged via wheelchair, hospital escort: Yes.  Special equipment needed: Not Applicable    Be sure to schedule a follow-up appointment with your primary care doctor or any specialists as instructed.     Discharge Plan:   Influenza Vaccine Indication: Not indicated: Previously immunized this influenza season and > 8 years of age    I understand that a diet low in cholesterol, fat, and sodium is recommended for good health. Unless I have been given specific instructions below for another diet, I accept this instruction as my diet prescription.   Other diet: Regular    Special Instructions: None    · Is patient discharged on Warfarin / Coumadin?   No     Depression / Suicide Risk    As you are discharged from this [facility] facility, it is important to learn how to keep safe from harming yourself.    Recognize the warning signs:  · Abrupt changes in personality, positive or negative- including increase in energy   · Giving away possessions  · Change in eating patterns- significant weight changes-  positive or negative  · Change in sleeping patterns- unable to sleep or sleeping all the time   · Unwillingness or inability to communicate  · Depression  · Unusual sadness, discouragement and loneliness  · Talk of wanting to die  · Neglect of personal appearance   · Rebelliousness- reckless behavior  · Withdrawal from people/activities they love  · Confusion- inability to concentrate     If you or a loved one observes any of these behaviors or has concerns about self-harm, here's what you can do:  · Talk about it- your feelings and reasons for harming yourself  · Remove any means that you might use to hurt yourself (examples: pills, rope, extension cords, firearm)  · Get professional help from the community (Mental Health, Substance Abuse, psychological counseling)  · Do not be alone:Call your Safe Contact- someone whom you trust  who will be there for you.  · Call your local CRISIS HOTLINE 120-2232 or 258-697-3544  · Call your local Children's Mobile Crisis Response Team [place] (661) 092-8869 or www.[employer]  · Call the toll free National Suicide Prevention Hotlines   · National Suicide Prevention Lifeline 511-670-XZTR (1427)  · National [employer] Line Network 800-SUICIDE (337-5029)

## 2018-04-03 NOTE — PROGRESS NOTES
"   Orthopaedic PA Progress Note    Interval changes:Did well overnight, dressing change today, OK for transfer from Ortho standpoint    ROS - Patient denies any new issues. No chest pain, dyspnea, or fever.  Pain well controlled.    Blood pressure 135/59, pulse 75, temperature 36.7 °C (98 °F), resp. rate 15, height 1.626 m (5' 4\"), weight 63.5 kg (140 lb), SpO2 93 %, not currently breastfeeding.    Patient seen and examined  No acute distress  Breathing non labored  RRR  Surgical dressing is clean, dry, and intact. Exchanged. Underlying incision clean dry and well approximated. Patient has been pulling off dressings, anali change to regular DSD. Patient clearly fires tibialis anterior, EHL, and gastrocnemius/soleus. Sensation is intact to light touch throughout superficial peroneal, deep peroneal, tibial, saphenous, and sural nerve distributions. Strong and palpable 2+ dorsalis pedis and posterior tibial pulses with capillary refill less than 2 seconds. No lower leg tenderness or discomfort.    Recent Labs      04/01/18   0402  04/02/18   0524   WBC  8.1   --    RBC  3.46*   --    HEMOGLOBIN  9.2*  9.4*   HEMATOCRIT  29.3*  29.5*   MCV  84.7   --    MCH  26.6*   --    MCHC  31.4*   --    RDW  46.3   --    PLATELETCT  184   --    MPV  10.0   --      Active Hospital Problems    Diagnosis   • Hypertension [I10]     Priority: Medium   • Hyperlipidemia [E78.5]     Priority: Low   • Hyperglycemia [R73.9]   • Advanced dementia [F03.90]     A/P POD#4 S/P L Ricco  Dementia     Wt bearing status - AT  PT/OT-initiated  Wound care:dressing left in place  Drains - no  Almaguer-no  Sutures/Staples out- 10-14 days post operatively  Antibiotics: Rocephin  DVT Prophylaxis- TEDS/SCDs/Foot pumps.   Lovenox: 40 mg daily, Duration-until ambulatory > 150'  Future Procedures - not anticipated  Case Coordination for Discharge Planning - Disposition per Med Team, OK to transfer from Ortho perspective    "

## 2018-04-03 NOTE — PROGRESS NOTES
Comfortable, no complaints    Vitals:    04/02/18 1500 04/02/18 2000 04/03/18 0400 04/03/18 0700   BP: 112/55 133/64 115/87 135/59   Pulse: 95 94 78 75   Resp: 16 16 16 15   Temp: 37 °C (98.6 °F) 37.2 °C (99 °F) 36.8 °C (98.2 °F) 36.7 °C (98 °F)   SpO2: 91% 97% 92% 93%   Weight:       Height:         LLE:  Dressing c/d/i  F/e ankle, toes  Foot w/w/p    POD#3 s/p L hip paulino    - WBAT, posterior hip precautions  - PT/OT  - Lovenox  - Dispo planning

## 2018-04-03 NOTE — PROGRESS NOTES
Assessment completed and charted. Patient Oriented to self only. Q2 turning in progress. Patient tolerates turning and bed changes prn incontinence poorly due to stiffness of back, BUE, and BLE. Patient denies pain after settling in new positions.

## 2018-04-03 NOTE — DISCHARGE PLANNING
Medical Social Work    SW notified [facility] of pt's transport time 1330.  SW also notified bedside nurse.

## 2018-04-03 NOTE — PROGRESS NOTES
"   Orthopaedic PA Progress Note    Interval changes:Sitting in chair, up with assist for exam, pleasantly demented    ROS - Patient denies any new issues. No chest pain, dyspnea, or fever.  Pain well controlled.    Blood pressure 112/55, pulse 95, temperature 37 °C (98.6 °F), resp. rate 16, height 1.626 m (5' 4\"), weight 63.5 kg (140 lb), SpO2 91 %, not currently breastfeeding.    Patient seen and examined  No acute distress  Breathing non labored  RRR  Surgical dressing is clean, dry, and intact. Patient clearly fires tibialis anterior, EHL, and gastrocnemius/soleus. Sensation is intact to light touch throughout superficial peroneal, deep peroneal, tibial, saphenous, and sural nerve distributions. Strong and palpable 2+ dorsalis pedis and posterior tibial pulses with capillary refill less than 2 seconds. No lower leg tenderness or discomfort.    Recent Labs      03/31/18   0421  04/01/18   0402  04/02/18   0524   WBC  7.1  8.1   --    RBC  3.43*  3.46*   --    HEMOGLOBIN  9.2*  9.2*  9.4*   HEMATOCRIT  29.2*  29.3*  29.5*   MCV  85.1  84.7   --    MCH  26.8*  26.6*   --    MCHC  31.5*  31.4*   --    RDW  46.5  46.3   --    PLATELETCT  175  184   --    MPV  9.7  10.0   --        Active Hospital Problems    Diagnosis   • Hypertension [I10]     Priority: Medium   • Hyperlipidemia [E78.5]     Priority: Low   • Hyperglycemia [R73.9]   • Advanced dementia [F03.90]       Assessment/Plan:      POD#3 S/P L Ricco  Dementia    Wt bearing status - AT  PT/OT-initiated  Wound care:dressing left in place  Drains - no  Almaguer-no  Sutures/Staples out- 10-14 days post operatively  Antibiotics: Rocephin  DVT Prophylaxis- TEDS/SCDs/Foot pumps.   Lovenox: 40 mg daily, Duration-until ambulatory > 150'  Future Procedures - not anticipated  Case Coordination for Discharge Planning - Disposition per Med Team, OK to transfer from Ortho perspective      "

## 2018-04-03 NOTE — CARE PLAN
Problem: Skin Integrity  Goal: Risk for impaired skin integrity will decrease    Intervention: Implement precautions to protect skin integrity in collaboration with the interdisciplinary team  Q2 turning in progress.

## 2018-04-04 LAB
BACTERIA BLD CULT: NORMAL
BACTERIA BLD CULT: NORMAL
SIGNIFICANT IND 70042: NORMAL
SIGNIFICANT IND 70042: NORMAL
SITE SITE: NORMAL
SITE SITE: NORMAL
SOURCE SOURCE: NORMAL
SOURCE SOURCE: NORMAL

## 2018-05-30 RX ORDER — AMLODIPINE BESYLATE 10 MG/1
TABLET ORAL
Qty: 90 TAB | Refills: 0 | OUTPATIENT
Start: 2018-05-30

## 2018-05-30 NOTE — TELEPHONE ENCOUNTER
Was the patient seen in the last year in this department? Yes     Does patient have an active prescription for medications requested? No     Received Request Via: Pharmacy      Pt met protocol?: Yes, last ov 11/1/17  Medication was dc'd 3/30/18 by  in hospital  BP Readings from Last 1 Encounters:   04/03/18 135/59

## 2018-05-30 NOTE — TELEPHONE ENCOUNTER
Please ask daughter regarding this refill; it was discontinued since Nena was hospitalized 4/2018 but I never saw her thereafter so I am not sure how her blood pressures are doing now. If there is confusion then she needs to be seen.

## 2018-05-31 NOTE — TELEPHONE ENCOUNTER
Daughter, , notified.  She stated that she is on hospice and is not sure if she is taking it.  I advised that since she is on hospice she would need to talk to the doctor who is with her at the hospice.   understood.

## 2018-06-01 RX ORDER — CETIRIZINE HYDROCHLORIDE 10 MG/1
10 TABLET ORAL DAILY
Qty: 90 TAB | Refills: 3 | Status: SHIPPED | OUTPATIENT
Start: 2018-06-01

## 2018-06-27 DIAGNOSIS — F03.90 DEMENTIA WITHOUT BEHAVIORAL DISTURBANCE, UNSPECIFIED DEMENTIA TYPE: ICD-10-CM

## 2018-06-27 DIAGNOSIS — E86.0 DEHYDRATION WITH HYPONATREMIA: ICD-10-CM

## 2018-06-27 DIAGNOSIS — E87.1 DEHYDRATION WITH HYPONATREMIA: ICD-10-CM

## 2018-06-28 RX ORDER — POTASSIUM CHLORIDE 20 MEQ/1
TABLET, EXTENDED RELEASE ORAL
Qty: 180 TAB | Refills: 0 | Status: SHIPPED | OUTPATIENT
Start: 2018-06-28

## 2018-06-28 RX ORDER — MEMANTINE HYDROCHLORIDE 14 MG/1
CAPSULE, EXTENDED RELEASE ORAL
Qty: 90 CAP | Refills: 0 | Status: SHIPPED | OUTPATIENT
Start: 2018-06-28

## 2018-06-28 NOTE — TELEPHONE ENCOUNTER
Was the patient seen in the last year in this department? Yes     Does patient have an active prescription for medications requested? No     Received Request Via: Pharmacy      Pt met protocol?: Yes    LAST OV 11/01/2017    BP Readings from Last 1 Encounters:   04/03/18 135/59     Lab Results   Component Value Date/Time    SODIUM 137 04/02/2018 05:24 AM    POTASSIUM 3.7 04/02/2018 05:24 AM    CHLORIDE 105 04/02/2018 05:24 AM    CO2 24 04/02/2018 05:24 AM    GLUCOSE 135 (H) 04/02/2018 05:24 AM    BUN 20 04/02/2018 05:24 AM    CREATININE 0.77 04/02/2018 05:24 AM

## 2018-06-28 NOTE — TELEPHONE ENCOUNTER
Dr. Mary, I don't see where patient is still on Amlodipine 10mg. Refill as you see fit. Thank you.

## 2018-06-28 NOTE — TELEPHONE ENCOUNTER
Last seen by PCP 11/17. Will send 3 months to pharmacy. Patient is due for an appointment. Please schedule.

## 2018-06-29 RX ORDER — AMLODIPINE BESYLATE 10 MG/1
TABLET ORAL
Qty: 90 TAB | Refills: 0 | OUTPATIENT
Start: 2018-06-29

## 2018-06-29 NOTE — TELEPHONE ENCOUNTER
Spoke with pt's granddtr who states amlodipine did not sound familiar.  She is going to check into it to make sure.

## 2018-07-27 ENCOUNTER — TELEPHONE (OUTPATIENT)
Dept: MEDICAL GROUP | Facility: PHYSICIAN GROUP | Age: 78
End: 2018-07-27

## 2018-07-27 DIAGNOSIS — I10 ESSENTIAL HYPERTENSION: ICD-10-CM

## 2018-07-27 NOTE — TELEPHONE ENCOUNTER
Pharmacy is requesting revail per-fit medium underwear to use twice daily as needed   Not on pt medication list or history   Wants this sent to pharmacy listed     Was the patient seen in the last year in this department? Yes    Does patient have an active prescription for medications requested? No     Received Request Via: Pharmacy      Pt met protocol?: Yes pt last ov 11/2017

## 2018-07-28 NOTE — TELEPHONE ENCOUNTER
I have only seen her once back on 11/2017 and since then it seems she has been enrolled in hospice. Once enrolled in hospice any orders and prescriptions are handled through them.

## 2018-07-30 RX ORDER — ASPIRIN 81 MG/1
TABLET ORAL
Qty: 90 TAB | Refills: 0 | Status: SHIPPED | OUTPATIENT
Start: 2018-07-30

## 2018-07-30 NOTE — TELEPHONE ENCOUNTER
Was the patient seen in the last year in this department? Yes    Does patient have an active prescription for medications requested? No     Received Request Via: Pharmacy      Pt met protocol?: Yes, OV 11/17

## 2018-08-27 DIAGNOSIS — F03.91 DEMENTIA WITH BEHAVIORAL DISTURBANCE, UNSPECIFIED DEMENTIA TYPE: ICD-10-CM

## 2018-08-29 RX ORDER — LANOLIN ALCOHOL/MO/W.PET/CERES
CREAM (GRAM) TOPICAL
Qty: 90 TAB | Refills: 1 | Status: SHIPPED | OUTPATIENT
Start: 2018-08-29

## 2021-01-15 DIAGNOSIS — Z23 NEED FOR VACCINATION: ICD-10-CM

## (undated) DEVICE — SUTURE 5 TI-CRON HOS-14 - (36/BX)

## (undated) DEVICE — GLOVE BIOGEL SZ 8 SURGICAL PF LTX - (50PR/BX 4BX/CA)

## (undated) DEVICE — PROTECTOR ULNA NERVE - (36PR/CA)

## (undated) DEVICE — SODIUM CHL. IRRIGATION 0.9% 3000ML (4EA/CA 65CA/PF)

## (undated) DEVICE — SUCTION INSTRUMENT YANKAUER BULBOUS TIP W/O VENT (50EA/CA)

## (undated) DEVICE — PACK TOTAL HIP - (1/CA)

## (undated) DEVICE — ELECTRODE DUAL RETURN W/ CORD - (50/PK)

## (undated) DEVICE — GLOVE BIOGEL ECLIPSE PF LATEX SIZE 9.0

## (undated) DEVICE — STOCKINET TUBULAR 6IN STERILE - 6 X 48YDS (25/CA)

## (undated) DEVICE — MASK ANESTHESIA ADULT  - (100/CA)

## (undated) DEVICE — ELECTRODE 850 FOAM ADHESIVE - HYDROGEL RADIOTRNSPRNT (50/PK)

## (undated) DEVICE — GLOVE BIOGEL PI ORTHO SZ 8.5 PF LF (40/BX)

## (undated) DEVICE — LENS/HOOD FOR SPACESUIT - (32/PK) PEEL AWAY FACE

## (undated) DEVICE — GOWN SURGEONS X-LARGE - DISP. (30/CA)

## (undated) DEVICE — WRAP COBAN SELF-ADHERENT 6 IN X  5YDS STERILE TAN (12/CA)

## (undated) DEVICE — DRAPE U ORTHOPEDIC - (10/BX)

## (undated) DEVICE — GLOVE BIOGEL PI ORTHO SZ 7.5 PF LF (40PR/BX)

## (undated) DEVICE — DRESSING POST OP BORDER 4 X 10 (5EA/BX)

## (undated) DEVICE — CANISTER SUCTION 3000ML MECHANICAL FILTER AUTO SHUTOFF MEDI-VAC NONSTERILE LF DISP  (40EA/CA)

## (undated) DEVICE — SUTURE 2-0 VICRYL PLUS CTX - 8 X 18 INCH (12/BX)

## (undated) DEVICE — TUBING CLEARLINK DUO-VENT - C-FLO (48EA/CA)

## (undated) DEVICE — SUTURE 1 VICRYL PLUS CTX - 8 X 18 INCH (12/BX)

## (undated) DEVICE — SODIUM CHL IRRIGATION 0.9% 1000ML (12EA/CA)

## (undated) DEVICE — LACTATED RINGERS INJ 1000 ML - (14EA/CA 60CA/PF)

## (undated) DEVICE — KIT ANESTHESIA W/CIRCUIT & 3/LT BAG W/FILTER (20EA/CA)

## (undated) DEVICE — TIP INTPLS HFLO ML ORFC BTRY - (12/CS)  FOR SURGILAV

## (undated) DEVICE — PAD LAP STERILE 18 X 18 - (5/PK 40PK/CA)

## (undated) DEVICE — NEPTUNE 4 PORT MANIFOLD - (20/PK)

## (undated) DEVICE — CHLORAPREP 26 ML APPLICATOR - ORANGE TINT(25/CA)

## (undated) DEVICE — BLADE SAGITTAL SAW DUAL CUT 75.0 X 25.0MM (1/EA)

## (undated) DEVICE — BLANKET WARMING LOWER BODY - (10/CA) INACTIVE USE #8585

## (undated) DEVICE — SET LEADWIRE 5 LEAD BEDSIDE DISPOSABLE ECG (1SET OF 5/EA)

## (undated) DEVICE — SET EXTENSION WITH 2 PORTS (48EA/CA) ***PART #2C8610 IS A SUBSTITUTE*****

## (undated) DEVICE — SPINAL

## (undated) DEVICE — GLOVE BIOGEL ECLIPSE  PF LATEX SIZE 6.5 (50PR/BX)

## (undated) DEVICE — HANDPIECE 10FT INTPLS SCT PLS IRRIGATION HAND CONTROL SET (6/PK)

## (undated) DEVICE — HEAD HOLDER JUNIOR/ADULT

## (undated) DEVICE — PEN SKIN MARKER W/RULER - (50EA/BX)

## (undated) DEVICE — DRAPE STRLE REG TOWEL 18X24 - (10/BX 4BX/CA)"

## (undated) DEVICE — GOWN WARMING STANDARD FLEX - (30/CA)

## (undated) DEVICE — KIT EVACUATER 3 SPRING PVC LF 1/8 DRAIN SIZE (10EA/CA)"

## (undated) DEVICE — SENSOR SPO2 NEO LNCS ADHESIVE (20/BX) SEE USER NOTES

## (undated) DEVICE — GLOVE BIOGEL SZ 6.5 SURGICAL PF LTX (50PR/BX 4BX/CA)

## (undated) DEVICE — SUTURE GENERAL

## (undated) DEVICE — KIT ROOM DECONTAMINATION